# Patient Record
Sex: FEMALE | Race: BLACK OR AFRICAN AMERICAN | NOT HISPANIC OR LATINO | Employment: UNEMPLOYED | URBAN - METROPOLITAN AREA
[De-identification: names, ages, dates, MRNs, and addresses within clinical notes are randomized per-mention and may not be internally consistent; named-entity substitution may affect disease eponyms.]

---

## 2020-07-29 ENCOUNTER — OFFICE VISIT (OUTPATIENT)
Dept: ORTHOPEDIC SURGERY | Facility: CLINIC | Age: 40
End: 2020-07-29

## 2020-07-29 VITALS — HEIGHT: 72 IN | TEMPERATURE: 97.3 F | BODY MASS INDEX: 28.04 KG/M2 | WEIGHT: 207 LBS

## 2020-07-29 DIAGNOSIS — M25.512 LEFT SHOULDER PAIN, UNSPECIFIED CHRONICITY: Primary | ICD-10-CM

## 2020-07-29 PROCEDURE — 20605 DRAIN/INJ JOINT/BURSA W/O US: CPT | Performed by: ORTHOPAEDIC SURGERY

## 2020-07-29 PROCEDURE — 99203 OFFICE O/P NEW LOW 30 MIN: CPT | Performed by: ORTHOPAEDIC SURGERY

## 2020-07-29 PROCEDURE — 73030 X-RAY EXAM OF SHOULDER: CPT | Performed by: ORTHOPAEDIC SURGERY

## 2020-07-29 RX ORDER — METHYLPREDNISOLONE ACETATE 80 MG/ML
80 INJECTION, SUSPENSION INTRA-ARTICULAR; INTRALESIONAL; INTRAMUSCULAR; SOFT TISSUE
Status: COMPLETED | OUTPATIENT
Start: 2020-07-29 | End: 2020-07-29

## 2020-07-29 RX ADMIN — METHYLPREDNISOLONE ACETATE 80 MG: 80 INJECTION, SUSPENSION INTRA-ARTICULAR; INTRALESIONAL; INTRAMUSCULAR; SOFT TISSUE at 10:30
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
## 2020-08-14 ENCOUNTER — TELEPHONE (OUTPATIENT)
Dept: ORTHOPEDIC SURGERY | Facility: CLINIC | Age: 40
End: 2020-08-14

## 2020-08-14 DIAGNOSIS — M25.512 LEFT SHOULDER PAIN, UNSPECIFIED CHRONICITY: Primary | ICD-10-CM

## 2020-08-14 NOTE — TELEPHONE ENCOUNTER
Patient is still having some pain in her Lt shldr and would like to proceed with therapy. Please advise.cld

## 2020-08-18 ENCOUNTER — TREATMENT (OUTPATIENT)
Dept: PHYSICAL THERAPY | Facility: CLINIC | Age: 40
End: 2020-08-18

## 2020-08-18 DIAGNOSIS — M25.512 LEFT SHOULDER PAIN, UNSPECIFIED CHRONICITY: Primary | ICD-10-CM

## 2020-08-18 PROCEDURE — 97014 ELECTRIC STIMULATION THERAPY: CPT | Performed by: PHYSICAL THERAPIST

## 2020-08-18 PROCEDURE — 97161 PT EVAL LOW COMPLEX 20 MIN: CPT | Performed by: PHYSICAL THERAPIST
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
## 2020-08-21 ENCOUNTER — TREATMENT (OUTPATIENT)
Dept: PHYSICAL THERAPY | Facility: CLINIC | Age: 40
End: 2020-08-21

## 2020-08-21 DIAGNOSIS — M25.512 LEFT SHOULDER PAIN, UNSPECIFIED CHRONICITY: Primary | ICD-10-CM

## 2020-08-21 PROCEDURE — 97110 THERAPEUTIC EXERCISES: CPT | Performed by: PHYSICAL THERAPIST

## 2020-08-21 PROCEDURE — 97014 ELECTRIC STIMULATION THERAPY: CPT | Performed by: PHYSICAL THERAPIST

## 2020-08-21 PROCEDURE — 97035 APP MDLTY 1+ULTRASOUND EA 15: CPT | Performed by: PHYSICAL THERAPIST

## 2020-08-25 NOTE — PROGRESS NOTES
Physical Therapy Daily Progress Note    Visit #2    Subjective     Crystal Shoulders reports: no new complaints.       Objective   See Exercise, Manual, and Modality Logs for complete treatment.       Assessment/Plan  Tolerated AAROM activities well without pain.   Progress per Plan of Care           Manual Therapy:    0     mins  44643;  Therapeutic Exercise:    20     mins  10351;     Neuromuscular Abbi:    0    mins  53116;    Therapeutic Activity:     0     mins  94534;     Gait Trainin     mins  76561;     Ultrasound:     8     mins  60975;    Electrical Stimulation:    15     mins  18174 ( );    Timed Treatment:   28   mins   Total Treatment:     43   mins    Mariah Vicente PT, DPT  Physical Therapist  [place] License #[id number]

## 2020-08-26 ENCOUNTER — TREATMENT (OUTPATIENT)
Dept: PHYSICAL THERAPY | Facility: CLINIC | Age: 40
End: 2020-08-26

## 2020-08-26 DIAGNOSIS — M25.512 LEFT SHOULDER PAIN, UNSPECIFIED CHRONICITY: Primary | ICD-10-CM

## 2020-08-26 PROCEDURE — 97035 APP MDLTY 1+ULTRASOUND EA 15: CPT | Performed by: PHYSICAL THERAPIST

## 2020-08-26 PROCEDURE — 97110 THERAPEUTIC EXERCISES: CPT | Performed by: PHYSICAL THERAPIST

## 2020-08-26 PROCEDURE — 97014 ELECTRIC STIMULATION THERAPY: CPT | Performed by: PHYSICAL THERAPIST

## 2020-08-26 NOTE — PROGRESS NOTES
Physical Therapy Daily Progress Note    Visit #3    Subjective     Crystal Shoulders reports: no new complaints      Objective   See Exercise, Manual, and Modality Logs for complete treatment.       Assessment/Plan  Progressed scapular strengthening exercises, tolerated well.  Progress per Plan of Care           Manual Therapy:    0     mins  81531;  Therapeutic Exercise:    20     mins  49734;     Neuromuscular Abbi:    0    mins  99548;    Therapeutic Activity:     0     mins  80503;     Gait Trainin     mins  22352;     Ultrasound:     8     mins  20644;    Electrical Stimulation:    15     mins  08828 ( );    Timed Treatment:   28   mins   Total Treatment:     43   mins    Mariah Vicente PT, DPT  Physical Therapist  [place] License #[id number]

## 2020-09-14 ENCOUNTER — TREATMENT (OUTPATIENT)
Dept: PHYSICAL THERAPY | Facility: CLINIC | Age: 40
End: 2020-09-14

## 2020-09-14 DIAGNOSIS — M25.512 LEFT SHOULDER PAIN, UNSPECIFIED CHRONICITY: Primary | ICD-10-CM

## 2020-09-14 PROCEDURE — 97014 ELECTRIC STIMULATION THERAPY: CPT | Performed by: PHYSICAL THERAPIST

## 2020-09-14 PROCEDURE — 97035 APP MDLTY 1+ULTRASOUND EA 15: CPT | Performed by: PHYSICAL THERAPIST

## 2020-09-14 PROCEDURE — 97140 MANUAL THERAPY 1/> REGIONS: CPT | Performed by: PHYSICAL THERAPIST

## 2020-09-14 NOTE — PROGRESS NOTES
Physical Therapy Daily Progress Note    Visit #4    Subjective     Crystal Shoulders reports: she has been away from PT for the last 2 weeks because she had Covid 19. Her shoulder pain is a little worse, is having more pain on the top of her shoulder, with numbness/tingling into her arm.       Objective          Palpation   Left   No palpable tenderness to the cervical paraspinals, lower trapezius, middle trapezius, scalenes, sternocleidomastoid and suboccipitals.   Hypertonic in the levator scapulae and upper trapezius.   Tenderness of the levator scapulae and upper trapezius.     Tests   Cervical   Negative repeated extension.     Left   Negative active compression ([place]), cervical distraction and Spurling's sign.     Right   Negative active compression ([place]), cervical distraction and Spurling's sign.       See Exercise, Manual, and Modality Logs for complete treatment.       Assessment/Plan  Pt with muscle tightness in UT/levator tightness resulting in neural irritation. Improved symptoms after Rx, will resume exercises at next visit.  Progress per Plan of Care           Manual Therapy:    20     mins  86789;  Therapeutic Exercise:    6     mins  14142;     Neuromuscular Abbi:    0    mins  21438;    Therapeutic Activity:     0     mins  55140;     Gait Trainin     mins  53645;     Ultrasound:     8     mins  98771;    Electrical Stimulation:    15     mins  99067 ( );    Timed Treatment:   34   mins   Total Treatment:     50   mins    Mariah Vicente PT, DPT  Physical Therapist  [place] License #[id number]

## 2020-09-16 ENCOUNTER — TREATMENT (OUTPATIENT)
Dept: PHYSICAL THERAPY | Facility: CLINIC | Age: 40
End: 2020-09-16

## 2020-09-16 DIAGNOSIS — M25.512 LEFT SHOULDER PAIN, UNSPECIFIED CHRONICITY: Primary | ICD-10-CM

## 2020-09-16 PROCEDURE — 97014 ELECTRIC STIMULATION THERAPY: CPT | Performed by: PHYSICAL THERAPIST

## 2020-09-16 PROCEDURE — 97110 THERAPEUTIC EXERCISES: CPT | Performed by: PHYSICAL THERAPIST

## 2020-09-16 NOTE — PROGRESS NOTES
Physical Therapy Daily Progress Note    Visit #5    Subjective     Crystal Shoulders reports: pain is about the same. Numbness into her arm is improved.       Objective   See Exercise, Manual, and Modality Logs for complete treatment.       Assessment/Plan  Resumed exercises without pain. Encouraged pt to perform postural exercises frequently, as significant forward shoulder posture is likely contributing to symptoms.  Progress per Plan of Care, reassess next visit           Manual Therapy:    0     mins  76088;  Therapeutic Exercise:    25     mins  23238;     Neuromuscular Abbi:    0    mins  54270;    Therapeutic Activity:     0     mins  93785;     Gait Trainin     mins  65088;     Ultrasound:     8     mins  83137;    Electrical Stimulation:    15     mins  86100 ( );    Timed Treatment:   33   mins   Total Treatment:     48   mins    Mariah Vicente PT, DPT  Physical Therapist  [place] License #[id number]

## 2020-09-21 ENCOUNTER — TREATMENT (OUTPATIENT)
Dept: PHYSICAL THERAPY | Facility: CLINIC | Age: 40
End: 2020-09-21

## 2020-09-21 DIAGNOSIS — M25.512 LEFT SHOULDER PAIN, UNSPECIFIED CHRONICITY: Primary | ICD-10-CM

## 2020-09-21 PROCEDURE — 97110 THERAPEUTIC EXERCISES: CPT | Performed by: PHYSICAL THERAPIST

## 2020-09-21 PROCEDURE — 97035 APP MDLTY 1+ULTRASOUND EA 15: CPT | Performed by: PHYSICAL THERAPIST

## 2020-09-21 PROCEDURE — 97014 ELECTRIC STIMULATION THERAPY: CPT | Performed by: PHYSICAL THERAPIST

## 2020-09-21 NOTE — PROGRESS NOTES
Re-Assessment / Re-Certification    Patient: Renee Mattsons   : 1980  Diagnosis/ICD-10 Code:  Left shoulder pain, unspecified chronicity [M25.512]  Referring practitioner: Tal Reed MD  Date of Initial Visit: 2020  Today's Date: 2020  Patient seen for 6 sessions      Subjective:   Renee Shoulders reports: some improvement in pain, but still with difficulty reaching overhead or behind back because of pain. Also some numbness in shoulder.   Subjective Questionnaire: QuickDASH: 48% limitation (improved from 57% at initial evaluation)  Clinical Progress: improved  Home Program Compliance: Yes  Treatment has included: therapeutic exercise, electrical stimulation, ultrasound, moist heat and cryotherapy    Subjective   Objective          Active Range of Motion   Left Shoulder   Flexion: 154 degrees   Abduction: 136 degrees       Assessment/Plan  Progress toward previous goals: Partially Met    Short Term Goals: 4 weeks. Patient will:  1. Be independent with initial HEP (MET)  2. Be instructed in posture and body mechanics. (MET)    Long Term Goals: 8 weeks. Pt will:  1. Exhibit (L) shoulder AROM to WFL to allow for reaching overhead and out (ABD) without pain limiting function. (PROGRESSING)  2. Demonstrate improved Left UE MMT of >/= 4+/5 to allow for performance of ADL's/household management/recreational activities. (PROGRESSING)  3. Pt able to reach overhead and lift 10# to allow for return to doing home/yard/recreational activities with min to no pain. (NOT MET)  4. Report perceived disability </=10% based on QuickDASH (NOT MET)      Recommendations: Continue as planned  Timeframe: 1 month  Prognosis to achieve goals: good    PT Signature: Mariah Vicente PT, DPT                         Physical Therapist                         [place] License #[id number]    Based upon review of the patient's progress and continued therapy plan, it is my medical opinion that Crystal Shoulders should continue  physical therapy treatment at [facility]  [address] [facility], [address] [address] [phone].    Signature: __________________________________      Manual Therapy:    0     mins  59269;  Therapeutic Exercise:    30     mins  59875;     Neuromuscular Abbi:    0    mins  17927;    Therapeutic Activity:     0     mins  13598;     Gait Trainin     mins  39276;     Ultrasound:     8     mins  16424;    Electrical Stimulation:    15     mins  63225 ( );    Timed Treatment:   38   mins   Total Treatment:     53   mins

## 2020-09-23 ENCOUNTER — TREATMENT (OUTPATIENT)
Dept: PHYSICAL THERAPY | Facility: CLINIC | Age: 40
End: 2020-09-23

## 2020-09-23 DIAGNOSIS — M25.512 LEFT SHOULDER PAIN, UNSPECIFIED CHRONICITY: Primary | ICD-10-CM

## 2020-09-23 PROCEDURE — 97014 ELECTRIC STIMULATION THERAPY: CPT | Performed by: PHYSICAL THERAPIST

## 2020-09-23 PROCEDURE — 97110 THERAPEUTIC EXERCISES: CPT | Performed by: PHYSICAL THERAPIST

## 2020-09-23 PROCEDURE — 97140 MANUAL THERAPY 1/> REGIONS: CPT | Performed by: PHYSICAL THERAPIST

## 2020-09-23 PROCEDURE — 97035 APP MDLTY 1+ULTRASOUND EA 15: CPT | Performed by: PHYSICAL THERAPIST

## 2020-09-23 NOTE — PROGRESS NOTES
" Physical Therapy Daily Progress Note    Visit #7    Subjective     Crystal Shoulders reports: she feels like she \"slept wrong\", shoulder is sore      Objective   See Exercise, Manual, and Modality Logs for complete treatment.       Assessment/Plan  Tenderness/increased tone in upper trap. Improved with manual therapy.  Progress per Plan of Care           Manual Therapy:    10     mins  57245;  Therapeutic Exercise:    20     mins  29146;     Neuromuscular Abbi:    0    mins  34582;    Therapeutic Activity:     0     mins  44237;     Gait Trainin     mins  44022;     Ultrasound:     8     mins  87517;    Electrical Stimulation:    15     mins  46650 ( );    Timed Treatment:   38   mins   Total Treatment:     53   mins    Mariah Vicente PT, DPT  Physical Therapist  [place] License #[id number]                    "

## 2020-09-28 ENCOUNTER — TREATMENT (OUTPATIENT)
Dept: PHYSICAL THERAPY | Facility: CLINIC | Age: 40
End: 2020-09-28

## 2020-09-28 DIAGNOSIS — M25.512 LEFT SHOULDER PAIN, UNSPECIFIED CHRONICITY: Primary | ICD-10-CM

## 2020-09-28 PROCEDURE — 97014 ELECTRIC STIMULATION THERAPY: CPT | Performed by: PHYSICAL THERAPIST

## 2020-09-28 PROCEDURE — 97110 THERAPEUTIC EXERCISES: CPT | Performed by: PHYSICAL THERAPIST

## 2020-09-28 PROCEDURE — 97035 APP MDLTY 1+ULTRASOUND EA 15: CPT | Performed by: PHYSICAL THERAPIST

## 2020-09-28 NOTE — PROGRESS NOTES
" Physical Therapy Daily Progress Note    Visit #8    Subjective     Crystal Shoulders reports: she feels that she is \"sleeping wrong\" and making shoulder hurt. Got a massage on Friday, feels that this helped some.       Objective   See Exercise, Manual, and Modality Logs for complete treatment.       Assessment/Plan  Pt is able to correct posture with min VC's. No pain with exercises when she maintains good posture.  Progress per Plan of Care           Manual Therapy:    0     mins  17991;  Therapeutic Exercise:    30     mins  59225;     Neuromuscular Abbi:    0    mins  75762;    Therapeutic Activity:     0     mins  20256;     Gait Trainin     mins  41722;     Ultrasound:     8     mins  51510;    Electrical Stimulation:    15     mins  82770 ( );    Timed Treatment:   38   mins   Total Treatment:     53   mins    Mariah Vicente PT, DPT  Physical Therapist  [place] License #[id number]                    "

## 2020-10-06 ENCOUNTER — TREATMENT (OUTPATIENT)
Dept: PHYSICAL THERAPY | Facility: CLINIC | Age: 40
End: 2020-10-06

## 2020-10-06 DIAGNOSIS — M25.512 LEFT SHOULDER PAIN, UNSPECIFIED CHRONICITY: Primary | ICD-10-CM

## 2020-10-06 PROCEDURE — 97110 THERAPEUTIC EXERCISES: CPT | Performed by: PHYSICAL THERAPIST

## 2020-10-06 PROCEDURE — 97014 ELECTRIC STIMULATION THERAPY: CPT | Performed by: PHYSICAL THERAPIST

## 2020-10-06 PROCEDURE — 97035 APP MDLTY 1+ULTRASOUND EA 15: CPT | Performed by: PHYSICAL THERAPIST

## 2020-10-06 NOTE — PROGRESS NOTES
Physical Therapy Daily Progress Note    Visit #9    Subjective     Crystal Shoulders reports: she has been through a lot over the past week, hasn't had much sleep. Shoulder is sore.      Objective   See Exercise, Manual, and Modality Logs for complete treatment.       Assessment/Plan  Focused on ROM and modalities today, will progress exercises next visit.  Progress per Plan of Care           Manual Therapy:    0     mins  67207;  Therapeutic Exercise:    20     mins  27824;     Neuromuscular Abbi:    0    mins  65253;    Therapeutic Activity:     0     mins  54569;     Gait Trainin     mins  90634;     Ultrasound:     8     mins  51969;    Electrical Stimulation:    15     mins  07702 ( );    Timed Treatment:   28   mins   Total Treatment:     43   mins    Mariah Vicente PT, DPT  Physical Therapist  [place] License #[id number]

## 2020-10-08 ENCOUNTER — TREATMENT (OUTPATIENT)
Dept: PHYSICAL THERAPY | Facility: CLINIC | Age: 40
End: 2020-10-08

## 2020-10-08 DIAGNOSIS — M25.512 LEFT SHOULDER PAIN, UNSPECIFIED CHRONICITY: Primary | ICD-10-CM

## 2020-10-08 PROCEDURE — 97014 ELECTRIC STIMULATION THERAPY: CPT | Performed by: PHYSICAL THERAPIST

## 2020-10-08 PROCEDURE — 97110 THERAPEUTIC EXERCISES: CPT | Performed by: PHYSICAL THERAPIST

## 2020-10-08 PROCEDURE — 97035 APP MDLTY 1+ULTRASOUND EA 15: CPT | Performed by: PHYSICAL THERAPIST

## 2020-10-08 NOTE — PROGRESS NOTES
" Physical Therapy Daily Progress Note    Visit #10    Subjective     Crystal Shoulders reports: shoulder is \"ok\", notices pain when driving      Objective   See Exercise, Manual, and Modality Logs for complete treatment.       Assessment/Plan  ROM is WNL, strength is improving. Pain is largely unchanged.   Progress per Plan of Care           Manual Therapy:    0     mins  49414;  Therapeutic Exercise:    30     mins  02437;     Neuromuscular Abbi:    0    mins  47588;    Therapeutic Activity:     0     mins  55249;     Gait Trainin     mins  48023;     Ultrasound:     8     mins  83965;    Electrical Stimulation:    15     mins  75308 ( );    Timed Treatment:   38   mins   Total Treatment:     53   mins    Mariah Vicente PT, DPT  Physical Therapist  [place] License #[id number]                    "

## 2020-10-12 ENCOUNTER — TREATMENT (OUTPATIENT)
Dept: PHYSICAL THERAPY | Facility: CLINIC | Age: 40
End: 2020-10-12

## 2020-10-12 DIAGNOSIS — M25.512 LEFT SHOULDER PAIN, UNSPECIFIED CHRONICITY: Primary | ICD-10-CM

## 2020-10-12 PROCEDURE — 97014 ELECTRIC STIMULATION THERAPY: CPT | Performed by: PHYSICAL THERAPIST

## 2020-10-12 PROCEDURE — 97035 APP MDLTY 1+ULTRASOUND EA 15: CPT | Performed by: PHYSICAL THERAPIST

## 2020-10-12 PROCEDURE — 97110 THERAPEUTIC EXERCISES: CPT | Performed by: PHYSICAL THERAPIST

## 2020-10-13 NOTE — PROGRESS NOTES
Physical Therapy Daily Progress Note    Visit #11    Subjective     Crystal Shoulders reports: numbness in bilateral arms over the weekend. Resolved within a day. Shoulder overall is slightly better than before starting PT, but no improvement over the past couple of weeks. Exercises make symptoms better short term.       Objective   See Exercise, Manual, and Modality Logs for complete treatment.       Assessment/Plan  Tolerated treatment well without increased pain or UE numbness. Advised pt to contact MD office regarding bilateral arm numbness, she verbalizes understanding.   Progress per Plan of Care           Manual Therapy:    0     mins  19302;  Therapeutic Exercise:    32     mins  27760;     Neuromuscular Abbi:    0    mins  20515;    Therapeutic Activity:     0     mins  28762;     Gait Trainin     mins  41254;     Ultrasound:     8     mins  42097;    Electrical Stimulation:    15     mins  48459 ( );    Timed Treatment:   40   mins   Total Treatment:     55   mins    Mariah Vicente PT, DPT  Physical Therapist  [place] License #[id number]

## 2020-10-14 ENCOUNTER — TREATMENT (OUTPATIENT)
Dept: PHYSICAL THERAPY | Facility: CLINIC | Age: 40
End: 2020-10-14

## 2020-10-14 DIAGNOSIS — M25.512 LEFT SHOULDER PAIN, UNSPECIFIED CHRONICITY: Primary | ICD-10-CM

## 2020-10-14 PROCEDURE — 97035 APP MDLTY 1+ULTRASOUND EA 15: CPT | Performed by: PHYSICAL THERAPIST

## 2020-10-14 PROCEDURE — 97014 ELECTRIC STIMULATION THERAPY: CPT | Performed by: PHYSICAL THERAPIST

## 2020-10-14 PROCEDURE — 97110 THERAPEUTIC EXERCISES: CPT | Performed by: PHYSICAL THERAPIST

## 2020-10-14 NOTE — PROGRESS NOTES
Physical Therapy Daily Progress Note    Visit #12    Subjective     Crystal Shoulders reports: she is having a little bit of numbness in her hands today. Spoke to MD, who ordered an MRI on her neck.      Objective   See Exercise, Manual, and Modality Logs for complete treatment.       Assessment/Plan  Modified some exercises to reduce strain on C-spine. Tolerated well overall.   Progress per Plan of Care           Manual Therapy:    0     mins  29451;  Therapeutic Exercise:    30     mins  48452;     Neuromuscular Abbi:    0    mins  04586;    Therapeutic Activity:     0     mins  53190;     Gait Trainin     mins  60745;     Ultrasound:     8     mins  03307;    Electrical Stimulation:    15     mins  94109 ( );    Timed Treatment:   38   mins   Total Treatment:     53   mins    Mariah Vicente PT, DPT  Physical Therapist  [place] License #[id number]

## 2020-10-27 ENCOUNTER — TREATMENT (OUTPATIENT)
Dept: PHYSICAL THERAPY | Facility: CLINIC | Age: 40
End: 2020-10-27

## 2020-10-27 DIAGNOSIS — M25.512 LEFT SHOULDER PAIN, UNSPECIFIED CHRONICITY: Primary | ICD-10-CM

## 2020-10-27 PROCEDURE — 97014 ELECTRIC STIMULATION THERAPY: CPT | Performed by: PHYSICAL THERAPIST

## 2020-10-27 PROCEDURE — 97035 APP MDLTY 1+ULTRASOUND EA 15: CPT | Performed by: PHYSICAL THERAPIST

## 2020-10-27 PROCEDURE — 97110 THERAPEUTIC EXERCISES: CPT | Performed by: PHYSICAL THERAPIST

## 2020-10-27 NOTE — PROGRESS NOTES
Re-Assessment / Re-Certification    Patient: Renee Mattsons   : 1980  Diagnosis/ICD-10 Code:  Left shoulder pain, unspecified chronicity [M25.512]  Referring practitioner: Tal Reed MD  Date of Initial Visit: 2020  Today's Date: 10/27/2020  Patient seen for 13 sessions      Subjective:   Renee Shoulders reports: continued pain in shoulder, little change overall.  Subjective Questionnaire: QuickDASH: 66% limitation (worsened from 48% at last reassessment)  Clinical Progress: worsened  Home Program Compliance: Yes  Treatment has included: therapeutic exercise, manual therapy, electrical stimulation, ultrasound and cryotherapy    Subjective   Objective          Active Range of Motion   Left Shoulder   Flexion: 142 degrees with pain  Abduction: 105 degrees with pain  External rotation BTH: C2 with pain  Internal rotation BTB: L5 with pain    Strength/Myotome Testing     Left Shoulder     Planes of Motion   Flexion: 3-   Abduction: 3-   External rotation at 45°: 3-   Internal rotation at 45°: 3-       Assessment/Plan  Progress toward previous goals: Partially Met    Short Term Goals: 4 weeks. Patient will:  1. Be independent with initial HEP (MET)  2. Be instructed in posture and body mechanics. (MET)    Long Term Goals: 8 weeks. Pt will:  1. Exhibit (L) shoulder AROM to WFL to allow for reaching overhead and out (ABD) without pain limiting function. (PROGRESSING)  2. Demonstrate improved Left UE MMT of >/= 4+/5 to allow for performance of ADL's/household management/recreational activities. (PROGRESSING)  3. Pt able to reach overhead and lift 10# to allow for return to doing home/yard/recreational activities with min to no pain. (NOT MET)  4. Report perceived disability </=10% based on QuickDASH (NOT MET)      Recommendations: Continue with recommendations pt to contact MD for follow up due to worsening symptoms  Timeframe: 1 month  Prognosis to achieve goals: good    PT Signature: Mariah Vicente  PT, DPT                         Physical Therapist                         [place] License #[id number]    Based upon review of the patient's progress and continued therapy plan, it is my medical opinion that Crystal Shoulders should continue physical therapy treatment at [facility]  [address] [place] [id number] [phone].    Signature: __________________________________      Manual Therapy:    0     mins  59857;  Therapeutic Exercise:    35     mins  58350;     Neuromuscular Abbi:    0    mins  39734;    Therapeutic Activity:     0     mins  36172;     Gait Trainin     mins  39367;     Ultrasound:     8     mins  59636;    Electrical Stimulation:    15     mins  11242 ( );    Timed Treatment:   43   mins   Total Treatment:     58   mins

## 2020-10-29 ENCOUNTER — TREATMENT (OUTPATIENT)
Dept: PHYSICAL THERAPY | Facility: CLINIC | Age: 40
End: 2020-10-29

## 2020-10-29 DIAGNOSIS — M25.512 LEFT SHOULDER PAIN, UNSPECIFIED CHRONICITY: Primary | ICD-10-CM

## 2020-10-29 PROCEDURE — 97014 ELECTRIC STIMULATION THERAPY: CPT | Performed by: PHYSICAL THERAPIST

## 2020-10-29 PROCEDURE — 97110 THERAPEUTIC EXERCISES: CPT | Performed by: PHYSICAL THERAPIST

## 2020-10-29 PROCEDURE — 97035 APP MDLTY 1+ULTRASOUND EA 15: CPT | Performed by: PHYSICAL THERAPIST

## 2020-10-29 NOTE — PROGRESS NOTES
Physical Therapy Daily Progress Note    Visit #14    Subjective     Crystal Shoulders reports: shoulder is still hurting. Has an appointment with Dr. Reed on . Back and neck are also hurting more, had MRI yesterday and is seeing MD today.      Objective   See Exercise, Manual, and Modality Logs for complete treatment.       Assessment/Plan  Shoulder PROM is WNL, AROM is limited by pain. Will hold PT at this time pending visit with Dr. Reed.   Progress per Plan of Care           Manual Therapy:    0     mins  61364;  Therapeutic Exercise:    30     mins  83559;     Neuromuscular Abbi:    0    mins  96069;    Therapeutic Activity:     0     mins  27415;     Gait Trainin     mins  62447;     Ultrasound:     8     mins  30885;    Electrical Stimulation:    15     mins  17993 ( );    Timed Treatment:   38   mins   Total Treatment:     53   mins    Mariah Vicente PT, DPT  Physical Therapist  [place] License #[id number]

## 2020-11-09 ENCOUNTER — OFFICE VISIT (OUTPATIENT)
Dept: ORTHOPEDIC SURGERY | Facility: CLINIC | Age: 40
End: 2020-11-09

## 2020-11-09 VITALS — BODY MASS INDEX: 27.36 KG/M2 | WEIGHT: 202 LBS | TEMPERATURE: 97.3 F | HEIGHT: 72 IN

## 2020-11-09 DIAGNOSIS — G89.29 CHRONIC LEFT SHOULDER PAIN: Primary | ICD-10-CM

## 2020-11-09 DIAGNOSIS — M25.512 CHRONIC LEFT SHOULDER PAIN: Primary | ICD-10-CM

## 2020-11-09 PROCEDURE — 99212 OFFICE O/P EST SF 10 MIN: CPT | Performed by: ORTHOPAEDIC SURGERY

## 2020-11-10 ENCOUNTER — TREATMENT (OUTPATIENT)
Dept: PHYSICAL THERAPY | Facility: CLINIC | Age: 40
End: 2020-11-10

## 2020-11-10 DIAGNOSIS — M25.512 LEFT SHOULDER PAIN, UNSPECIFIED CHRONICITY: Primary | ICD-10-CM

## 2020-11-10 PROCEDURE — 97035 APP MDLTY 1+ULTRASOUND EA 15: CPT | Performed by: PHYSICAL THERAPIST

## 2020-11-10 PROCEDURE — 97110 THERAPEUTIC EXERCISES: CPT | Performed by: PHYSICAL THERAPIST

## 2020-11-10 PROCEDURE — 97014 ELECTRIC STIMULATION THERAPY: CPT | Performed by: PHYSICAL THERAPIST

## 2020-11-10 NOTE — PROGRESS NOTES
Physical Therapy Daily Progress Note    Visit #15    Subjective     Crystal Shoulders reports: she saw MD, who recommended continuing PT until max improvement reached. Stated that it could take several more months for symptoms to resolve.       Objective   See Exercise, Manual, and Modality Logs for complete treatment.       Assessment/Plan  Tolerated well. Likely reached max benefit with ultrasound, will D/C this and increase scapular stabilization exercises.   Progress per Plan of Care           Manual Therapy:    0     mins  83642;  Therapeutic Exercise:    30     mins  69641;     Neuromuscular Abbi:    0    mins  56188;    Therapeutic Activity:     0     mins  21051;     Gait Trainin     mins  48085;     Ultrasound:     8     mins  16150;    Electrical Stimulation:    15     mins  81693 ( );    Timed Treatment:   38   mins   Total Treatment:     53   mins    Mariah Vicente PT, DPT  Physical Therapist  [place] License #[id number]

## 2020-11-10 NOTE — PROGRESS NOTES
Chief Complaint: Follow-up regarding left shoulder pain    HPI: Ms. Francois follows up for her left shoulder.  The last injection did help.  She has been diligent about going to PT and that seems to have helped as well.  Overall, she says things are better but the shoulder continues to bother her.  Symptoms are worse when lying on her left side as well as with certain reaching and lifting movements.    Exam: Left shoulder is briefly examined.  Skin is benign.  Mild tenderness over the acromioclavicular joint.  Full motion.  Positive active compression maneuver.    Imaging: None taken    Assessment: Low-grade left acromioclavicular separation    Plan: We discussed further options for her.  She did not wish to get the injection repeated today.  She would like to give the therapy a bit more time.  I consider that is a very reasonable approach.  For now, she will follow-up as needed.  If the symptoms persist or recur, I told her I will be happy to reevaluate at any point down the road.    Tal Reed MD  11/09/2020

## 2020-11-12 ENCOUNTER — TREATMENT (OUTPATIENT)
Dept: PHYSICAL THERAPY | Facility: CLINIC | Age: 40
End: 2020-11-12

## 2020-11-12 DIAGNOSIS — M25.512 LEFT SHOULDER PAIN, UNSPECIFIED CHRONICITY: Primary | ICD-10-CM

## 2020-11-12 PROCEDURE — 97014 ELECTRIC STIMULATION THERAPY: CPT | Performed by: PHYSICAL THERAPIST

## 2020-11-12 PROCEDURE — 97110 THERAPEUTIC EXERCISES: CPT | Performed by: PHYSICAL THERAPIST

## 2020-11-12 NOTE — PROGRESS NOTES
Physical Therapy Daily Progress Note    Visit #16    Subjective     Crystal Shoulders reports: no new complaints.      Objective   See Exercise, Manual, and Modality Logs for complete treatment.       Assessment/Plan  D/C'd ultrasound, progress shoulder stabilization exercises today. Tolerated well without adverse reaction.  Progress per Plan of Care           Manual Therapy:    0     mins  08342;  Therapeutic Exercise:    38     mins  60414;     Neuromuscular Abbi:    0    mins  07858;    Therapeutic Activity:     0     mins  18265;     Gait Trainin     mins  01053;     Ultrasound:     0     mins  30724;    Electrical Stimulation:    15     mins  10664 ( );    Timed Treatment:   38   mins   Total Treatment:     53   mins    Mariah Vicente PT, DPT  Physical Therapist  [place] License #[id number]

## 2020-11-17 ENCOUNTER — TREATMENT (OUTPATIENT)
Dept: PHYSICAL THERAPY | Facility: CLINIC | Age: 40
End: 2020-11-17

## 2020-11-17 DIAGNOSIS — M25.512 LEFT SHOULDER PAIN, UNSPECIFIED CHRONICITY: Primary | ICD-10-CM

## 2020-11-17 PROCEDURE — 97014 ELECTRIC STIMULATION THERAPY: CPT | Performed by: PHYSICAL THERAPIST

## 2020-11-17 PROCEDURE — 97530 THERAPEUTIC ACTIVITIES: CPT | Performed by: PHYSICAL THERAPIST

## 2020-11-17 PROCEDURE — 97110 THERAPEUTIC EXERCISES: CPT | Performed by: PHYSICAL THERAPIST

## 2020-11-17 NOTE — PROGRESS NOTES
Physical Therapy Daily Progress Note    Visit #17    Subjective     Crystal Shoulders reports: no recent changes in symptoms. Exercises felt OK last visit.       Objective   See Exercise, Manual, and Modality Logs for complete treatment.       Assessment/Plan  Tolerated new scapular stabilization activities well without pain.   Progress per Plan of Care           Manual Therapy:    0     mins  71008;  Therapeutic Exercise:    20     mins  59136;     Neuromuscular Abbi:    0    mins  20489;    Therapeutic Activity:     15     mins  27883;     Gait Trainin     mins  38188;     Ultrasound:     0     mins  49663;    Electrical Stimulation:    15     mins  94560 ( );    Timed Treatment:   35   mins   Total Treatment:     50   mins    Mariah Vicente PT, DPT  Physical Therapist  [place] License #[id number]

## 2020-11-23 ENCOUNTER — TREATMENT (OUTPATIENT)
Dept: PHYSICAL THERAPY | Facility: CLINIC | Age: 40
End: 2020-11-23

## 2020-11-23 DIAGNOSIS — M25.512 LEFT SHOULDER PAIN, UNSPECIFIED CHRONICITY: Primary | ICD-10-CM

## 2020-11-23 PROCEDURE — 97110 THERAPEUTIC EXERCISES: CPT | Performed by: PHYSICAL THERAPIST

## 2020-11-23 PROCEDURE — 97014 ELECTRIC STIMULATION THERAPY: CPT | Performed by: PHYSICAL THERAPIST

## 2020-11-23 NOTE — PROGRESS NOTES
Physical Therapy Daily Progress Note    Visit #18    Subjective     Crystal Shoulders reports: no new complaints with her shoulder. Is getting injections in her low back this afternoon.      Objective   See Exercise, Manual, and Modality Logs for complete treatment.       Assessment/Plan  Progressed scapular stabilization exercises today, tolerated well without increased pain.  Progress per Plan of Care           Manual Therapy:    0     mins  26609;  Therapeutic Exercise:    35     mins  86705;     Neuromuscular Abbi:    0    mins  43705;    Therapeutic Activity:     0     mins  35480;     Gait Trainin     mins  25769;     Ultrasound:     0     mins  72263;    Electrical Stimulation:    15     mins  12698 ( );    Timed Treatment:   35   mins   Total Treatment:     50   mins    Mariah Vicente PT, DPT  Physical Therapist  [place] License #[id number]

## 2020-11-25 ENCOUNTER — TREATMENT (OUTPATIENT)
Dept: PHYSICAL THERAPY | Facility: CLINIC | Age: 40
End: 2020-11-25

## 2020-11-25 DIAGNOSIS — M25.512 LEFT SHOULDER PAIN, UNSPECIFIED CHRONICITY: Primary | ICD-10-CM

## 2020-11-25 PROCEDURE — 97014 ELECTRIC STIMULATION THERAPY: CPT | Performed by: PHYSICAL THERAPIST

## 2020-11-25 PROCEDURE — 97110 THERAPEUTIC EXERCISES: CPT | Performed by: PHYSICAL THERAPIST

## 2020-11-25 NOTE — PROGRESS NOTES
Physical Therapy Daily Progress Note    Visit #19    Subjective     Crystal Shoulders reports: her shoulder is still hurting at night, but is hurting less during the day.      Objective   See Exercise, Manual, and Modality Logs for complete treatment.       Assessment/Plan  Pain free AROM is improved.  Progress per Plan of Care, reassess next visit           Manual Therapy:    0     mins  23942;  Therapeutic Exercise:    35     mins  19914;     Neuromuscular Abbi:    0    mins  83941;    Therapeutic Activity:     0     mins  60724;     Gait Trainin     mins  18423;     Ultrasound:     0     mins  06433;    Electrical Stimulation:    15     mins  33291 ( );    Timed Treatment:   35   mins   Total Treatment:     50   mins    Mariah Vicente PT, DPT  Physical Therapist  [place] License #[id number]

## 2020-12-01 ENCOUNTER — TREATMENT (OUTPATIENT)
Dept: PHYSICAL THERAPY | Facility: CLINIC | Age: 40
End: 2020-12-01

## 2020-12-01 DIAGNOSIS — M25.512 LEFT SHOULDER PAIN, UNSPECIFIED CHRONICITY: Primary | ICD-10-CM

## 2020-12-01 PROCEDURE — 97014 ELECTRIC STIMULATION THERAPY: CPT | Performed by: PHYSICAL THERAPIST

## 2020-12-01 PROCEDURE — 97110 THERAPEUTIC EXERCISES: CPT | Performed by: PHYSICAL THERAPIST

## 2020-12-01 NOTE — PROGRESS NOTES
Re-Assessment / Re-Certification     Patient: Renee Mattsons   : 1980  Diagnosis/ICD-10 Code:  Left shoulder pain, unspecified chronicity [M25.512]  Referring practitioner: Tal Reed MD  Date of Initial Visit: 2020  Today's Date: 2020  Patient seen for 20 sessions        Subjective:   Renee Shoulders reports: continued pain in shoulder, little change overall.  Subjective Questionnaire: QuickDASH: 45% limitation (improved from 66% at last reassessment)  Clinical Progress: improved  Home Program Compliance: Yes  Treatment has included: therapeutic exercise, manual therapy, electrical stimulation, ultrasound and cryotherapy     Subjective   Objective            Active Range of Motion   Left Shoulder   Flexion: 151 degrees with pain  Abduction: 124 degrees with pain  External rotation BTH: C2 with pain  Internal rotation BTB: L5 with pain     Strength/Myotome Testing     Left Shoulder      Planes of Motion   Flexion: 4/5 in available range   Abduction: 4/5 in available range  External rotation at 45°: 4/5 in available range  Internal rotation at 45°: 4/5 in available range     Assessment/Plan  Progress toward previous goals: Partially Met     Short Term Goals: 4 weeks. Patient will:  1. Be independent with initial HEP (MET)  2. Be instructed in posture and body mechanics. (MET)    Long Term Goals: 8 weeks. Pt will:  1. Exhibit (L) shoulder AROM to WFL to allow for reaching overhead and out (ABD) without pain limiting function. (PROGRESSING)  2. Demonstrate improved Left UE MMT of >/= 4+/5 to allow for performance of ADL's/household management/recreational activities. (PROGRESSING)  3. Pt able to reach overhead and lift 10# to allow for return to doing home/yard/recreational activities with min to no pain. (NOT MET)  4. Report perceived disability </=10% based on QuickDASH (PROGRESSING)                Recommendations: Continue with focus on scapular stabilization exercises and functional  GH mobility.  Timeframe: 1 month  Prognosis to achieve goals: good     PT Signature: Mariah Vicente, PT, DPT                         Physical Therapist                         [place] License #[id number]     Based upon review of the patient's progress and continued therapy plan, it is my medical opinion that Crystal Shoulders should continue physical therapy treatment at [facility]  [address] [place] [id number] [phone].     Signature: __________________________________       Manual Therapy:            0     mins  87441;  Therapeutic Exercise:    35     mins  63385;     Neuromuscular Abbi:    0    mins  40123;    Therapeutic Activity:      0     mins  26271;     Gait Trainin     mins  60026;     Ultrasound:                     0     mins  42626;    Electrical Stimulation:    15     mins  82184 ( );     Timed Treatment:   35   mins   Total Treatment:     50   mins

## 2020-12-03 ENCOUNTER — TREATMENT (OUTPATIENT)
Dept: PHYSICAL THERAPY | Facility: CLINIC | Age: 40
End: 2020-12-03

## 2020-12-03 DIAGNOSIS — M25.512 LEFT SHOULDER PAIN, UNSPECIFIED CHRONICITY: Primary | ICD-10-CM

## 2020-12-03 PROCEDURE — 97110 THERAPEUTIC EXERCISES: CPT | Performed by: PHYSICAL THERAPIST

## 2020-12-03 PROCEDURE — 97014 ELECTRIC STIMULATION THERAPY: CPT | Performed by: PHYSICAL THERAPIST

## 2020-12-03 NOTE — PROGRESS NOTES
Physical Therapy Daily Progress Note    Visit #21    Subjective     Crystal Shoulders reports: overall shoulder is much improved, but notices now that she is able to get her arm overhead she is sleeping with arm in odd positions and waking with achy pain.       Objective   See Exercise, Manual, and Modality Logs for complete treatment.       Assessment/Plan  Advised pt to use a pillow to support arm when she sleeps. Progressed band resistance today, tolerated well without increased pain.  Progress per Plan of Care           Manual Therapy:    0     mins  37706;  Therapeutic Exercise:    35     mins  87888;     Neuromuscular Abbi:    0    mins  42680;    Therapeutic Activity:     0     mins  55737;     Gait Trainin     mins  90443;     Ultrasound:     0     mins  74702;    Electrical Stimulation:    15     mins  76513 ( );    Timed Treatment:   35   mins   Total Treatment:     50   mins    Mariah Vicente PT, DPT  Physical Therapist  [place] License #[id number]

## 2020-12-08 ENCOUNTER — TREATMENT (OUTPATIENT)
Dept: PHYSICAL THERAPY | Facility: CLINIC | Age: 40
End: 2020-12-08

## 2020-12-08 DIAGNOSIS — M25.512 LEFT SHOULDER PAIN, UNSPECIFIED CHRONICITY: Primary | ICD-10-CM

## 2020-12-08 PROCEDURE — 97014 ELECTRIC STIMULATION THERAPY: CPT | Performed by: PHYSICAL THERAPIST

## 2020-12-08 PROCEDURE — 97110 THERAPEUTIC EXERCISES: CPT | Performed by: PHYSICAL THERAPIST

## 2020-12-08 NOTE — PROGRESS NOTES
Physical Therapy Daily Progress Note    Visit #22    Subjective     Crystal Shoulders reports: she feels that she has more shoulder mobility, does notice soreness in AM because she is now sleeping with shoulder flexed.       Objective   See Exercise, Manual, and Modality Logs for complete treatment.       Assessment/Plan  Tolerated well. Advised pt to support arm on pillow to avoid prolonged time in end range flexion when sleeping.  Progress per Plan of Care           Manual Therapy:    0     mins  79466;  Therapeutic Exercise:    35     mins  14878;     Neuromuscular Abbi:    0    mins  88287;    Therapeutic Activity:     0     mins  75309;     Gait Trainin     mins  54600;     Ultrasound:     0     mins  49663;    Electrical Stimulation:    15     mins  07878 ( );    Timed Treatment:   35   mins   Total Treatment:     50   mins    Mariah Vicente PT, DPT  Physical Therapist  [place] License #[id number]

## 2020-12-14 ENCOUNTER — TREATMENT (OUTPATIENT)
Dept: PHYSICAL THERAPY | Facility: CLINIC | Age: 40
End: 2020-12-14

## 2020-12-14 DIAGNOSIS — M25.512 LEFT SHOULDER PAIN, UNSPECIFIED CHRONICITY: Primary | ICD-10-CM

## 2020-12-14 PROCEDURE — 97110 THERAPEUTIC EXERCISES: CPT | Performed by: PHYSICAL THERAPIST

## 2020-12-14 PROCEDURE — 97035 APP MDLTY 1+ULTRASOUND EA 15: CPT | Performed by: PHYSICAL THERAPIST

## 2020-12-14 PROCEDURE — 97014 ELECTRIC STIMULATION THERAPY: CPT | Performed by: PHYSICAL THERAPIST

## 2020-12-14 NOTE — PROGRESS NOTES
Physical Therapy Daily Progress Note    Visit #23    Subjective     Crystal Shoulders reports: her shoulder has been more painful the last couple of days.      Objective   See Exercise, Manual, and Modality Logs for complete treatment.       Assessment/Plan  Resumed ultrasound as pt is reporting more pain today. Will progress exercises as able.  Progress per Plan of Care           Manual Therapy:    0     mins  00110;  Therapeutic Exercise:    32     mins  55288;     Neuromuscular Abbi:    0    mins  21344;    Therapeutic Activity:     0     mins  46392;     Gait Trainin     mins  80154;     Ultrasound:     8     mins  24328;    Electrical Stimulation:    15     mins  98551 ( );    Timed Treatment:   40   mins   Total Treatment:     55   mins    Mariah Vicente PT, DPT  Physical Therapist  [place] License #[id number]

## 2020-12-18 ENCOUNTER — TREATMENT (OUTPATIENT)
Dept: PHYSICAL THERAPY | Facility: CLINIC | Age: 40
End: 2020-12-18

## 2020-12-18 DIAGNOSIS — M25.512 LEFT SHOULDER PAIN, UNSPECIFIED CHRONICITY: Primary | ICD-10-CM

## 2020-12-18 PROCEDURE — 97014 ELECTRIC STIMULATION THERAPY: CPT | Performed by: PHYSICAL THERAPIST

## 2020-12-18 PROCEDURE — 97110 THERAPEUTIC EXERCISES: CPT | Performed by: PHYSICAL THERAPIST

## 2020-12-18 NOTE — PROGRESS NOTES
" Physical Therapy Daily Progress Note    Visit #24    Subjective     Crystal Shoulders reports: shoulder is \"OK\"      Objective   See Exercise, Manual, and Modality Logs for complete treatment.       Assessment/Plan  Able to increase resistance with exercises without pain.   Progress per Plan of Care           Manual Therapy:    0     mins  15845;  Therapeutic Exercise:    34     mins  80709;     Neuromuscular Abbi:    0    mins  85757;    Therapeutic Activity:     0     mins  39553;     Gait Trainin     mins  02332;     Ultrasound:     0     mins  98179;    Electrical Stimulation:    15     mins  60792 ( );    Timed Treatment:   34   mins   Total Treatment:     49   mins    Mariah Vicente PT, DPT  Physical Therapist  [place] License #[id number]                    "

## 2020-12-21 ENCOUNTER — TREATMENT (OUTPATIENT)
Dept: PHYSICAL THERAPY | Facility: CLINIC | Age: 40
End: 2020-12-21

## 2020-12-21 DIAGNOSIS — M25.512 LEFT SHOULDER PAIN, UNSPECIFIED CHRONICITY: Primary | ICD-10-CM

## 2020-12-21 PROCEDURE — 97110 THERAPEUTIC EXERCISES: CPT | Performed by: PHYSICAL THERAPIST

## 2020-12-21 PROCEDURE — 97014 ELECTRIC STIMULATION THERAPY: CPT | Performed by: PHYSICAL THERAPIST

## 2020-12-21 NOTE — PROGRESS NOTES
" Physical Therapy Daily Progress Note    Visit #25    Subjective     Crystal Shoulders reports: shoulder is \"OK\" today      Objective   See Exercise, Manual, and Modality Logs for complete treatment.       Assessment/Plan  Scapular strength and posture are improving.  Progress per Plan of Care           Manual Therapy:    0     mins  66675;  Therapeutic Exercise:    34     mins  62823;     Neuromuscular Abbi:    0    mins  98279;    Therapeutic Activity:     0     mins  86468;     Gait Trainin     mins  59176;     Ultrasound:     0     mins  20822;    Electrical Stimulation:    15     mins  31997 ( );    Timed Treatment:   34   mins   Total Treatment:     49   mins    Mariah Vicente PT, DPT  Physical Therapist  [place] License #[id number]                    "

## 2020-12-29 ENCOUNTER — TREATMENT (OUTPATIENT)
Dept: PHYSICAL THERAPY | Facility: CLINIC | Age: 40
End: 2020-12-29

## 2020-12-29 DIAGNOSIS — M25.512 LEFT SHOULDER PAIN, UNSPECIFIED CHRONICITY: Primary | ICD-10-CM

## 2020-12-29 PROCEDURE — 97014 ELECTRIC STIMULATION THERAPY: CPT | Performed by: PHYSICAL THERAPIST

## 2020-12-29 PROCEDURE — 97110 THERAPEUTIC EXERCISES: CPT | Performed by: PHYSICAL THERAPIST

## 2020-12-29 NOTE — PROGRESS NOTES
Physical Therapy Daily Progress Note    Visit #26    Subjective     Crystal Shoulders reports: shoulder feels pretty good today. Still with pain when trying to sleep, however.       Objective   See Exercise, Manual, and Modality Logs for complete treatment.       Assessment/Plan  Tolerated well. Will benefit from continued progression of scapular stabilization and [place] strength activities.   Progress per Plan of Care           Manual Therapy:    0     mins  68552;  Therapeutic Exercise:    36     mins  01242;     Neuromuscular Abbi:    0    mins  93755;    Therapeutic Activity:     0     mins  60444;     Gait Trainin     mins  75453;     Ultrasound:     0     mins  41104;    Electrical Stimulation:    15     mins  28731 ( );    Timed Treatment:   36   mins   Total Treatment:     51   mins    Mariah Vicente PT, DPT  Physical Therapist  [place] License #[id number]

## 2020-12-31 ENCOUNTER — TREATMENT (OUTPATIENT)
Dept: PHYSICAL THERAPY | Facility: CLINIC | Age: 40
End: 2020-12-31

## 2020-12-31 DIAGNOSIS — M25.512 LEFT SHOULDER PAIN, UNSPECIFIED CHRONICITY: Primary | ICD-10-CM

## 2020-12-31 PROCEDURE — 97014 ELECTRIC STIMULATION THERAPY: CPT | Performed by: PHYSICAL THERAPIST

## 2020-12-31 PROCEDURE — 97110 THERAPEUTIC EXERCISES: CPT | Performed by: PHYSICAL THERAPIST

## 2021-01-04 ENCOUNTER — TREATMENT (OUTPATIENT)
Dept: PHYSICAL THERAPY | Facility: CLINIC | Age: 41
End: 2021-01-04

## 2021-01-04 DIAGNOSIS — M25.512 LEFT SHOULDER PAIN, UNSPECIFIED CHRONICITY: Primary | ICD-10-CM

## 2021-01-04 PROCEDURE — 97110 THERAPEUTIC EXERCISES: CPT | Performed by: PHYSICAL THERAPIST

## 2021-01-04 PROCEDURE — 97035 APP MDLTY 1+ULTRASOUND EA 15: CPT | Performed by: PHYSICAL THERAPIST

## 2021-01-04 PROCEDURE — 97014 ELECTRIC STIMULATION THERAPY: CPT | Performed by: PHYSICAL THERAPIST

## 2021-01-04 NOTE — PROGRESS NOTES
Re-Assessment / Re-Certification    Patient: Renee Mattsons   : 1980  Diagnosis/ICD-10 Code:  Left shoulder pain, unspecified chronicity [M25.512]  Referring practitioner: Tal Reed MD  Date of Initial Visit: 2020  Today's Date: 2020  Patient seen for 28 sessions      Subjective:   Renee Shoulders reports: she is noticing less and less pain during the day. Still painful at night when she is trying to sleep.  Subjective Questionnaire: QuickDASH: will assess at next visit  Clinical Progress: improved  Home Program Compliance: Yes  Treatment has included: therapeutic exercise, manual therapy, therapeutic activity, electrical stimulation, ultrasound and cryotherapy    Subjective   Objective          Active Range of Motion   Left Shoulder   Flexion: 151 degrees   Abduction: 149 degrees   External rotation BTH: C6   Internal rotation BTB: T12     Strength/Myotome Testing     Left Shoulder     Planes of Motion   Flexion: 4+   Extension: 4+   External rotation at 45°: 4   Internal rotation at 45°: 4     Isolated Muscles   Lower trapezius: 4   Middle trapezius: 4   Serratus anterior: 4       Assessment/Plan  Progress toward previous goals: Partially Met    Short Term Goals: 4 weeks. Patient will:  1. Be independent with initial HEP (MET)  2. Be instructed in posture and body mechanics. (MET)    Long Term Goals: 8 weeks. Pt will:  1. Exhibit (L) shoulder AROM to WFL to allow for reaching overhead and out (ABD) without pain limiting function. (PROGRESSING)  2. Demonstrate improved Left UE MMT of >/= 4+/5 to allow for performance of ADL's/household management/recreational activities. (PROGRESSING)  3. Pt able to reach overhead and lift 10# to allow for return to doing home/yard/recreational activities with min to no pain. (NOT MET)  4. Report perceived disability </=10% based on QuickDASH (PROGRESSING)      Recommendations: Continue as planned  Timeframe: 1 month  Prognosis to achieve goals:  good    PT Signature: Mariah Vicente, PT, DPT                         Physical Therapist                         [place] License #[id number]    Based upon review of the patient's progress and continued therapy plan, it is my medical opinion that Crystal Shoulders should continue physical therapy treatment at [facility]  [address] [facility], [address] [address] [phone].    Signature: __________________________________  Tal Reed MD    Manual Therapy:    0     mins  13044;  Therapeutic Exercise:    32     mins  66968;     Neuromuscular Abbi:    0    mins  69394;    Therapeutic Activity:     0     mins  64398;     Gait Trainin     mins  92068;     Ultrasound:     8     mins  69756;    Electrical Stimulation:    15     mins  51352 ( );    Timed Treatment:   40   mins   Total Treatment:     55   mins

## 2021-01-11 ENCOUNTER — TREATMENT (OUTPATIENT)
Dept: PHYSICAL THERAPY | Facility: CLINIC | Age: 41
End: 2021-01-11

## 2021-01-11 DIAGNOSIS — M25.512 LEFT SHOULDER PAIN, UNSPECIFIED CHRONICITY: Primary | ICD-10-CM

## 2021-01-11 PROCEDURE — 97110 THERAPEUTIC EXERCISES: CPT | Performed by: PHYSICAL THERAPIST

## 2021-01-11 PROCEDURE — 97035 APP MDLTY 1+ULTRASOUND EA 15: CPT | Performed by: PHYSICAL THERAPIST

## 2021-01-11 PROCEDURE — 97014 ELECTRIC STIMULATION THERAPY: CPT | Performed by: PHYSICAL THERAPIST

## 2021-01-11 NOTE — PROGRESS NOTES
" Physical Therapy Daily Progress Note    Visit #29    Subjective     Crystal Shoulders reports: shoulder is \"OK\", ultrasound helps.      Objective   See Exercise, Manual, and Modality Logs for complete treatment.       Assessment/Plan  Less point tenderness in anterior shoulder. Scapular strength continues to improve.  Progress per Plan of Care           Manual Therapy:    0     mins  87880;  Therapeutic Exercise:    32     mins  79578;     Neuromuscular Abbi:    0    mins  39369;    Therapeutic Activity:     0     mins  08599;     Gait Trainin     mins  25653;     Ultrasound:     8     mins  52619;    Electrical Stimulation:    15     mins  88474 ( );    Timed Treatment:   40   mins   Total Treatment:     55   mins    Mariah Vicente PT, DPT  Physical Therapist  [place] License #[id number]                    "

## 2021-01-18 ENCOUNTER — TREATMENT (OUTPATIENT)
Dept: PHYSICAL THERAPY | Facility: CLINIC | Age: 41
End: 2021-01-18

## 2021-01-18 DIAGNOSIS — M25.512 LEFT SHOULDER PAIN, UNSPECIFIED CHRONICITY: Primary | ICD-10-CM

## 2021-01-18 PROCEDURE — 97035 APP MDLTY 1+ULTRASOUND EA 15: CPT | Performed by: PHYSICAL THERAPIST

## 2021-01-18 PROCEDURE — 97014 ELECTRIC STIMULATION THERAPY: CPT | Performed by: PHYSICAL THERAPIST

## 2021-01-18 PROCEDURE — 97110 THERAPEUTIC EXERCISES: CPT | Performed by: PHYSICAL THERAPIST

## 2021-01-18 NOTE — PROGRESS NOTES
Physical Therapy Daily Progress Note    Visit #30    Subjective     Crystal Shoulders reports: she is worried that she has a tear in her shoulder. Is planning to see doctor. Has several days of minimal pain after PT days, especially with ultrasound.       Objective   See Exercise, Manual, and Modality Logs for complete treatment.       Assessment/Plan  Tolerated exercises well without pain. Agree with pt that additional follow up with MD is warranted.   Progress per Plan of Care           Manual Therapy:    0     mins  42659;  Therapeutic Exercise:    31     mins  01058;     Neuromuscular Abib:    0    mins  70064;    Therapeutic Activity:     0     mins  63101;     Gait Trainin     mins  71369;     Ultrasound:     8     mins  03774;    Electrical Stimulation:    15     mins  17131 ( );    Timed Treatment:   39   mins   Total Treatment:     54   mins    Mariah Vicente PT, DPT  Physical Therapist  [place] License #[id number]

## 2021-01-21 ENCOUNTER — TREATMENT (OUTPATIENT)
Dept: PHYSICAL THERAPY | Facility: CLINIC | Age: 41
End: 2021-01-21

## 2021-01-21 DIAGNOSIS — M25.512 LEFT SHOULDER PAIN, UNSPECIFIED CHRONICITY: Primary | ICD-10-CM

## 2021-01-21 PROCEDURE — 97035 APP MDLTY 1+ULTRASOUND EA 15: CPT | Performed by: PHYSICAL THERAPIST

## 2021-01-21 PROCEDURE — 97014 ELECTRIC STIMULATION THERAPY: CPT | Performed by: PHYSICAL THERAPIST

## 2021-01-21 PROCEDURE — 97110 THERAPEUTIC EXERCISES: CPT | Performed by: PHYSICAL THERAPIST

## 2021-01-21 NOTE — PROGRESS NOTES
Physical Therapy Daily Progress Note    Visit #31    Subjective     Crystal Shoulders reports: she is having more pain during the day. Exercises do not make pain better or worse. Modalities decrease pain. Hasn't yet scheduled to see MD.      Objective   See Exercise, Manual, and Modality Logs for complete treatment.       Assessment/Plan  Encouraged pt to schedule appointment with MD soon, as she has had some improvement with PT but progress has reached plateau in recent visits.   Progress per Plan of Care           Manual Therapy:    0     mins  23191;  Therapeutic Exercise:    32     mins  67696;     Neuromuscular Abbi:    0    mins  81459;    Therapeutic Activity:     0     mins  06561;     Gait Trainin     mins  71263;     Ultrasound:     8     mins  64740;    Electrical Stimulation:    15     mins  81663 ( );    Timed Treatment:   40   mins   Total Treatment:     55   mins    Mariah Vicente PT, DPT  Physical Therapist  [place] License #[id number]

## 2021-01-25 ENCOUNTER — TREATMENT (OUTPATIENT)
Dept: PHYSICAL THERAPY | Facility: CLINIC | Age: 41
End: 2021-01-25

## 2021-01-25 DIAGNOSIS — M25.512 LEFT SHOULDER PAIN, UNSPECIFIED CHRONICITY: Primary | ICD-10-CM

## 2021-01-25 PROCEDURE — 97035 APP MDLTY 1+ULTRASOUND EA 15: CPT | Performed by: PHYSICAL THERAPIST

## 2021-01-25 PROCEDURE — 97014 ELECTRIC STIMULATION THERAPY: CPT | Performed by: PHYSICAL THERAPIST

## 2021-01-25 PROCEDURE — 97110 THERAPEUTIC EXERCISES: CPT | Performed by: PHYSICAL THERAPIST

## 2021-01-25 NOTE — PROGRESS NOTES
Re-Assessment / Re-Certification    Patient: Renee Shoulders   : 1980  Diagnosis/ICD-10 Code:  Left shoulder pain, unspecified chronicity [M25.512]  Referring practitioner: Tal Reed MD  Date of Initial Visit: 2020  Today's Date: 2021  Patient seen for 32 sessions      Subjective:   Renee Shoulders reports: she has been having shoulder pain intermittently during the day. Still limiting her ability to sleep.   Subjective Questionnaire: QuickDASH: 59% limitation  Clinical Progress: unchanged  Home Program Compliance: Yes  Treatment has included: therapeutic exercise, manual therapy, electrical stimulation, ultrasound and cryotherapy    Active Range of Motion   Left Shoulder   Flexion: 135 degrees   Abduction: 141 degrees   External rotation BTH: C6   Internal rotation BTB: T12      Strength/Myotome Testing     Left Shoulder      Planes of Motion   Flexion: 4+   Extension: 4+   External rotation at 45°: 4   Internal rotation at 45°: 4      Isolated Muscles   Lower trapezius: 4   Middle trapezius: 4   Serratus anterior: 4     Assessment/Plan  Progress toward previous goals: Partially Met    Short Term Goals: 4 weeks. Patient will:  1. Be independent with initial HEP (MET)  2. Be instructed in posture and body mechanics. (MET)    Long Term Goals: 8 weeks. Pt will:  1. Exhibit (L) shoulder AROM to WFL to allow for reaching overhead and out (ABD) without pain limiting function. (PROGRESSING)  2. Demonstrate improved Left UE MMT of >/= 4+/5 to allow for performance of ADL's/household management/recreational activities. (PROGRESSING)  3. Pt able to reach overhead and lift 10# to allow for return to doing home/yard/recreational activities with min to no pain. (NOT MET)  4. Report perceived disability </=10% based on QuickDASH (PROGRESSING)      Recommendations: Continue with recommendations as discussed for last few visits, recommend pt follow up with MD for possible further medical management  as progress with PT has been minimal over the past month.  Timeframe: 1 month  Prognosis to achieve goals: good    PT Signature: Mariah Vicente, PT, DPT                         Physical Therapist                         [place] License #[id number]    Based upon review of the patient's progress and continued therapy plan, it is my medical opinion that Crystal Shoulders should continue physical therapy treatment at [facility]  [facility] [address] [address] [phone].    Signature: __________________________________      Manual Therapy:    0     mins  76504;  Therapeutic Exercise:    31     mins  86586;     Neuromuscular Abbi:    0    mins  98813;    Therapeutic Activity:     0     mins  89776;     Gait Trainin     mins  15378;     Ultrasound:     8     mins  38283;    Electrical Stimulation:    15     mins  01182 ( );    Timed Treatment:   39   mins   Total Treatment:     54   mins

## 2021-02-02 ENCOUNTER — TREATMENT (OUTPATIENT)
Dept: PHYSICAL THERAPY | Facility: CLINIC | Age: 41
End: 2021-02-02

## 2021-02-02 DIAGNOSIS — M25.512 LEFT SHOULDER PAIN, UNSPECIFIED CHRONICITY: Primary | ICD-10-CM

## 2021-02-02 PROCEDURE — 97035 APP MDLTY 1+ULTRASOUND EA 15: CPT | Performed by: PHYSICAL THERAPIST

## 2021-02-02 PROCEDURE — 97110 THERAPEUTIC EXERCISES: CPT | Performed by: PHYSICAL THERAPIST

## 2021-02-02 PROCEDURE — 97014 ELECTRIC STIMULATION THERAPY: CPT | Performed by: PHYSICAL THERAPIST

## 2021-02-02 NOTE — PROGRESS NOTES
Physical Therapy Daily Progress Note    Visit #33    Subjective     Crystal Shoulders reports: her shoulder is still painful, no improvement in the past several weeks.      Objective   See Exercise, Manual, and Modality Logs for complete treatment.       Assessment/Plan  Discussed again the recommendation for follow up with MD. She verbalizes understanding and states she will call to schedule.   Progress per Plan of Care           Manual Therapy:    0     mins  57903;  Therapeutic Exercise:    31     mins  90319;     Neuromuscular Abbi:    0    mins  55092;    Therapeutic Activity:     0     mins  36994;     Gait Trainin     mins  01037;     Ultrasound:     8     mins  68591;    Electrical Stimulation:    15     mins  69378 ( );    Timed Treatment:   39   mins   Total Treatment:     53   mins    Mariah Vicente PT, DPT  Physical Therapist  [place] License #[id number]

## 2021-02-04 ENCOUNTER — TREATMENT (OUTPATIENT)
Dept: PHYSICAL THERAPY | Facility: CLINIC | Age: 41
End: 2021-02-04

## 2021-02-04 DIAGNOSIS — M25.512 LEFT SHOULDER PAIN, UNSPECIFIED CHRONICITY: Primary | ICD-10-CM

## 2021-02-04 PROCEDURE — 97014 ELECTRIC STIMULATION THERAPY: CPT | Performed by: PHYSICAL THERAPIST

## 2021-02-04 PROCEDURE — 97035 APP MDLTY 1+ULTRASOUND EA 15: CPT | Performed by: PHYSICAL THERAPIST

## 2021-02-04 PROCEDURE — 97110 THERAPEUTIC EXERCISES: CPT | Performed by: PHYSICAL THERAPIST

## 2021-02-04 NOTE — PROGRESS NOTES
Physical Therapy Daily Progress Note    Visit #34    Subjective     Crystal Shoulders reports: she has an appointment with Dr. Reed on 2/10. States that she gets ~12 hours of pain relief from PT sessions, then pain returns.      Objective   See Exercise, Manual, and Modality Logs for complete treatment.       Assessment/Plan  Tolerated exercises well.   Progress per Plan of Care           Manual Therapy:    0     mins  03676;  Therapeutic Exercise:    31     mins  99919;     Neuromuscular Abbi:    0    mins  63331;    Therapeutic Activity:     0     mins  49056;     Gait Trainin     mins  37793;     Ultrasound:     8     mins  08810;    Electrical Stimulation:    15     mins  64706 ( );    Timed Treatment:   39   mins   Total Treatment:     54   mins    Mariah Vicente PT, DPT  Physical Therapist  [place] License #[id number]

## 2021-02-10 ENCOUNTER — OFFICE VISIT (OUTPATIENT)
Dept: ORTHOPEDIC SURGERY | Facility: CLINIC | Age: 41
End: 2021-02-10

## 2021-02-10 VITALS — TEMPERATURE: 97.2 F | HEIGHT: 72 IN | BODY MASS INDEX: 27.36 KG/M2 | WEIGHT: 202 LBS

## 2021-02-10 DIAGNOSIS — R20.2 NUMBNESS AND TINGLING IN LEFT ARM: Primary | ICD-10-CM

## 2021-02-10 DIAGNOSIS — R20.0 NUMBNESS AND TINGLING IN LEFT ARM: Primary | ICD-10-CM

## 2021-02-10 PROCEDURE — 99213 OFFICE O/P EST LOW 20 MIN: CPT | Performed by: ORTHOPAEDIC SURGERY

## 2021-02-11 ENCOUNTER — TREATMENT (OUTPATIENT)
Dept: PHYSICAL THERAPY | Facility: CLINIC | Age: 41
End: 2021-02-11

## 2021-02-11 DIAGNOSIS — M25.512 LEFT SHOULDER PAIN, UNSPECIFIED CHRONICITY: Primary | ICD-10-CM

## 2021-02-11 PROCEDURE — 97110 THERAPEUTIC EXERCISES: CPT | Performed by: PHYSICAL THERAPIST

## 2021-02-11 PROCEDURE — 97014 ELECTRIC STIMULATION THERAPY: CPT | Performed by: PHYSICAL THERAPIST

## 2021-02-11 NOTE — PROGRESS NOTES
Physical Therapy Daily Progress Note    Visit #35    Subjective     Crystal Shoulders reports: she saw MD, who has ordered an EMG/NCV of her LUE. Recommended continuing PT.       Objective   See Exercise, Manual, and Modality Logs for complete treatment.       Assessment/Plan  Tolerated well without increased pain. Pt requires cueing for postural awareness with exercise, to avoid rounded shoulders and forward head.  Progress per Plan of Care           Manual Therapy:    0     mins  30414;  Therapeutic Exercise:    10     mins  46278; direct    Neuromuscular Abbi:    0    mins  18732;    Therapeutic Activity:     0     mins  25042;     Gait Trainin     mins  35184;     Ultrasound:     8     mins  69762;    Electrical Stimulation:    15     mins  36777 ( );    Timed Treatment:   18   mins   Total Treatment:     55   mins    Mariah Vicente PT, DPT  Physical Therapist  [place] License #[id number]

## 2021-02-23 ENCOUNTER — TREATMENT (OUTPATIENT)
Dept: PHYSICAL THERAPY | Facility: CLINIC | Age: 41
End: 2021-02-23

## 2021-02-23 DIAGNOSIS — M25.512 LEFT SHOULDER PAIN, UNSPECIFIED CHRONICITY: Primary | ICD-10-CM

## 2021-02-23 PROCEDURE — 97035 APP MDLTY 1+ULTRASOUND EA 15: CPT | Performed by: PHYSICAL THERAPIST

## 2021-02-23 PROCEDURE — 97014 ELECTRIC STIMULATION THERAPY: CPT | Performed by: PHYSICAL THERAPIST

## 2021-02-23 PROCEDURE — 97110 THERAPEUTIC EXERCISES: CPT | Performed by: PHYSICAL THERAPIST

## 2021-02-23 NOTE — PROGRESS NOTES
"Re-Assessment / Re-Certification     Patient: Renee Mattsons   : 1980  Diagnosis/ICD-10 Code:  Left shoulder pain, unspecified chronicity [M25.512]  Referring practitioner: Tal Reed MD  Date of Initial Visit: 2020  Today's Date: 2021  Patient seen for 36 sessions        Subjective:   Renee Francois reports: shoulder is \"about the same\". Sleeping is still disturbed. Hasn't yet received a call about scheduling her EMG/NCV.  Subjective Questionnaire: QuickDASH: 59% limitation  Clinical Progress: unchanged  Home Program Compliance: Yes  Treatment has included: therapeutic exercise, manual therapy, electrical stimulation, ultrasound and cryotherapy     Active Range of Motion   Left Shoulder   Flexion: 161 degrees   Abduction: 101 degrees   External rotation BTH: C6   Internal rotation BTB: T12      Strength/Myotome Testing     Left Shoulder      Planes of Motion   Flexion: 4+   Extension: 4+   External rotation at 45°: 4   Internal rotation at 45°: 4      Isolated Muscles   Lower trapezius: 4   Middle trapezius: 4   Serratus anterior: 4      Assessment/Plan  Progress toward previous goals: Partially Met     Short Term Goals: 4 weeks. Patient will:  1. Be independent with initial HEP (MET)  2. Be instructed in posture and body mechanics. (MET)    Long Term Goals: 8 weeks. Pt will:  1. Exhibit (L) shoulder AROM to WFL to allow for reaching overhead and out (ABD) without pain limiting function. (PROGRESSING)  2. Demonstrate improved Left UE MMT of >/= 4+/5 to allow for performance of ADL's/household management/recreational activities. (PROGRESSING)  3. Pt able to reach overhead and lift 10# to allow for return to doing home/yard/recreational activities with min to no pain. (NOT MET)  4. Report perceived disability </=10% based on QuickDASH (PROGRESSING)                Recommendations: Continue with strengthening pending EMG/NCV  Timeframe: 1 month  Prognosis to achieve goals: good     PT " Signature: Mariah Vicente, PT, DPT                         Physical Therapist                         [place] License #[id number]     Based upon review of the patient's progress and continued therapy plan, it is my medical opinion that Crystal Shoulders should continue physical therapy treatment at [facility]  [address] [place] [address], [address] [address] [phone].     Signature: __________________________________       Manual Therapy:            0     mins  21895;  Therapeutic Exercise:    33     mins  10824;     Neuromuscular Abbi:    0    mins  92532;    Therapeutic Activity:      0     mins  88221;     Gait Trainin     mins  60284;     Ultrasound:                     8     mins  76928;    Electrical Stimulation:    15     mins  52408 ( );     Timed Treatment:   41   mins   Total Treatment:     56   mins

## 2021-02-25 ENCOUNTER — TREATMENT (OUTPATIENT)
Dept: PHYSICAL THERAPY | Facility: CLINIC | Age: 41
End: 2021-02-25

## 2021-02-25 DIAGNOSIS — M25.512 LEFT SHOULDER PAIN, UNSPECIFIED CHRONICITY: Primary | ICD-10-CM

## 2021-02-25 PROCEDURE — 97014 ELECTRIC STIMULATION THERAPY: CPT | Performed by: PHYSICAL THERAPIST

## 2021-02-25 PROCEDURE — 97110 THERAPEUTIC EXERCISES: CPT | Performed by: PHYSICAL THERAPIST

## 2021-02-25 PROCEDURE — 97035 APP MDLTY 1+ULTRASOUND EA 15: CPT | Performed by: PHYSICAL THERAPIST

## 2021-02-25 NOTE — PROGRESS NOTES
" Physical Therapy Daily Progress Note    Visit #37    Subjective     Crystal Shoulders reports: shoulder is \"the same\". EMG/NCV scheduled for .       Objective   See Exercise, Manual, and Modality Logs for complete treatment.       Assessment/Plan  Tolerated well, requires cueing for posture with some exercises.   Progress per Plan of Care           Manual Therapy:    0     mins  97464;  Therapeutic Exercise:    32     mins  13379;     Neuromuscular Abbi:    0    mins  81156;    Therapeutic Activity:     0     mins  66678;     Gait Trainin     mins  26996;     Ultrasound:     8     mins  30063;    Electrical Stimulation:    15     mins  47118 ( );    Timed Treatment:   40   mins   Total Treatment:     55   mins    Mariah Vicente PT, DPT  Physical Therapist  [place] License #[id number]                    "

## 2021-03-02 ENCOUNTER — TREATMENT (OUTPATIENT)
Dept: PHYSICAL THERAPY | Facility: CLINIC | Age: 41
End: 2021-03-02

## 2021-03-02 DIAGNOSIS — M25.512 LEFT SHOULDER PAIN, UNSPECIFIED CHRONICITY: Primary | ICD-10-CM

## 2021-03-02 PROCEDURE — 97110 THERAPEUTIC EXERCISES: CPT | Performed by: PHYSICAL THERAPIST

## 2021-03-02 PROCEDURE — 97035 APP MDLTY 1+ULTRASOUND EA 15: CPT | Performed by: PHYSICAL THERAPIST

## 2021-03-02 PROCEDURE — 97014 ELECTRIC STIMULATION THERAPY: CPT | Performed by: PHYSICAL THERAPIST

## 2021-03-02 NOTE — PROGRESS NOTES
Physical Therapy Daily Progress Note    Visit #38    Subjective     Crystal Shoulders reports: is taking new pain medicine for her back, so its hard to tell how her shoulder is feeling. Notes that shoulder feels tight when reaching overhead.      Objective   See Exercise, Manual, and Modality Logs for complete treatment.       Assessment/Plan  Added end range stretches today, tolerated well and improved AROM. Pt to perform these at home as well.  Progress per Plan of Care           Manual Therapy:    0     mins  69473;  Therapeutic Exercise:    31     mins  43304;     Neuromuscular Abbi:    0    mins  28964;    Therapeutic Activity:     0     mins  69483;     Gait Trainin     mins  29018;     Ultrasound:     8     mins  80475;    Electrical Stimulation:    15     mins  57524 ( );    Timed Treatment:   39   mins   Total Treatment:     54   mins    Mariah Vicente PT, DPT  Physical Therapist  [place] License #[id number]

## 2021-03-04 ENCOUNTER — TREATMENT (OUTPATIENT)
Dept: PHYSICAL THERAPY | Facility: CLINIC | Age: 41
End: 2021-03-04

## 2021-03-04 DIAGNOSIS — M25.512 LEFT SHOULDER PAIN, UNSPECIFIED CHRONICITY: Primary | ICD-10-CM

## 2021-03-04 PROCEDURE — 97035 APP MDLTY 1+ULTRASOUND EA 15: CPT | Performed by: PHYSICAL THERAPIST

## 2021-03-04 PROCEDURE — 97110 THERAPEUTIC EXERCISES: CPT | Performed by: PHYSICAL THERAPIST

## 2021-03-04 PROCEDURE — 97014 ELECTRIC STIMULATION THERAPY: CPT | Performed by: PHYSICAL THERAPIST

## 2021-03-04 NOTE — PROGRESS NOTES
" Physical Therapy Daily Progress Note    Visit #39    Subjective     Crystal Shoulders reports: feeling \"OK\"      Objective   See Exercise, Manual, and Modality Logs for complete treatment.       Assessment/Plan  Tolerated well without pain. Continues to require cueing for posture with exercises.   Progress per Plan of Care           Manual Therapy:    0     mins  07949;  Therapeutic Exercise:    31     mins  02464;     Neuromuscular Abbi:    0    mins  72742;    Therapeutic Activity:     0     mins  29188;     Gait Trainin     mins  43921;     Ultrasound:     8     mins  63147;    Electrical Stimulation:    15     mins  35272 ( );    Timed Treatment:   39   mins   Total Treatment:     54   mins    Mariah Vicente PT, DPT  Physical Therapist  [place] License #[id number]                    "

## 2021-03-16 ENCOUNTER — TREATMENT (OUTPATIENT)
Dept: PHYSICAL THERAPY | Facility: CLINIC | Age: 41
End: 2021-03-16

## 2021-03-16 DIAGNOSIS — M25.512 LEFT SHOULDER PAIN, UNSPECIFIED CHRONICITY: Primary | ICD-10-CM

## 2021-03-16 PROCEDURE — 97110 THERAPEUTIC EXERCISES: CPT | Performed by: PHYSICAL THERAPIST

## 2021-03-16 PROCEDURE — 97035 APP MDLTY 1+ULTRASOUND EA 15: CPT | Performed by: PHYSICAL THERAPIST

## 2021-03-16 PROCEDURE — 97014 ELECTRIC STIMULATION THERAPY: CPT | Performed by: PHYSICAL THERAPIST

## 2021-03-16 NOTE — PROGRESS NOTES
Physical Therapy Daily Progress Note    Visit #40    Subjective     Crystal Shoulders reports: she is scheduled for EMG/NCV next month. Shoulder pain is unchanged.       Objective   See Exercise, Manual, and Modality Logs for complete treatment.       Assessment/Plan  Tolerated well, exercise does not increase pain.  Progress per Plan of Care           Manual Therapy:    0     mins  71971;  Therapeutic Exercise:    32     mins  28643;     Neuromuscular Abbi:    0    mins  85075;    Therapeutic Activity:     0     mins  85676;     Gait Trainin     mins  48875;     Ultrasound:     8     mins  40398;    Electrical Stimulation:    15     mins  15292 ( );    Timed Treatment:   40   mins   Total Treatment:     55   mins    Mariah Vicente PT, DPT  Physical Therapist  [place] License #[id number]

## 2021-03-18 ENCOUNTER — TREATMENT (OUTPATIENT)
Dept: PHYSICAL THERAPY | Facility: CLINIC | Age: 41
End: 2021-03-18

## 2021-03-18 DIAGNOSIS — M25.512 LEFT SHOULDER PAIN, UNSPECIFIED CHRONICITY: Primary | ICD-10-CM

## 2021-03-18 PROCEDURE — PTSPMIN2 PR PHYS THER SP 16 TO 30 MINUTES: Performed by: PHYSICAL THERAPIST

## 2021-03-19 NOTE — PROGRESS NOTES
Physical Therapy Daily Progress Note    Visit #41    Subjective     Crystal Shoulders reports: some days her shoulder feels like it's getting better, some days it is worse. Pt feels that modalities help her manage symptoms, and give her a day or so of relief.       Objective   See Exercise, Manual, and Modality Logs for complete treatment.       Assessment/Plan  Pt to continue exercises at home, focus on modalities in clinic at this time.   Progress per Plan of Care           Manual Therapy:    0     mins  01945;  Therapeutic Exercise:    0     mins  67795;     Neuromuscular Abbi:    0    mins  40252;    Therapeutic Activity:     0     mins  10397;     Gait Trainin     mins  70174;     Ultrasound:     8     mins  98680;    Electrical Stimulation:    15     mins  96008 ( );    Timed Treatment:   8   mins   Total Treatment:     23   mins    Mariah Vicente PT, DPT  Physical Therapist  [place] License #[id number]

## 2021-03-25 ENCOUNTER — TREATMENT (OUTPATIENT)
Dept: PHYSICAL THERAPY | Facility: CLINIC | Age: 41
End: 2021-03-25

## 2021-03-25 DIAGNOSIS — M25.512 LEFT SHOULDER PAIN, UNSPECIFIED CHRONICITY: Primary | ICD-10-CM

## 2021-03-25 PROCEDURE — PTSPMIN2 PR PHYS THER SP 16 TO 30 MINUTES: Performed by: PHYSICAL THERAPIST

## 2021-03-25 NOTE — PROGRESS NOTES
Physical Therapy Daily Progress Note    Visit #42    Subjective     Crystal Shoulders reports: her shoulder feels tight, she is having trouble getting a good stretch at home.      Objective   See Exercise, Manual, and Modality Logs for complete treatment.       Assessment/Plan  Added joint mobilizations and manual stretching today, tolerated well and normal PROM after Rx. Encouraged pt to stretch again this evening.  Progress per Plan of Care, reassess next visit           Manual Therapy:    15     mins  36918;  Therapeutic Exercise:    0     mins  66494;     Neuromuscular Abbi:    0    mins  71240;    Therapeutic Activity:     0     mins  74554;     Gait Trainin     mins  81600;     Ultrasound:     8     mins  31932;    Electrical Stimulation:    15     mins  89710 ( );    Timed Treatment:   23   mins   Total Treatment:     38   mins    Mariah Vicente PT, DPT  Physical Therapist  [place] License #[id number]

## 2021-03-30 ENCOUNTER — TREATMENT (OUTPATIENT)
Dept: PHYSICAL THERAPY | Facility: CLINIC | Age: 41
End: 2021-03-30

## 2021-03-30 DIAGNOSIS — M25.512 LEFT SHOULDER PAIN, UNSPECIFIED CHRONICITY: Primary | ICD-10-CM

## 2021-03-30 PROCEDURE — PTSPMIN2 PR PHYS THER SP 16 TO 30 MINUTES: Performed by: PHYSICAL THERAPIST

## 2021-03-30 NOTE — PROGRESS NOTES
Re-Assessment / Re-Certification     Patient: Renee Shoulders   : 1980  Diagnosis/ICD-10 Code:  Left shoulder pain, unspecified chronicity [M25.512]  Referring practitioner: Tal Reed MD  Date of Initial Visit: 2020  Today's Date: 3/30/2021  Patient seen for 43 sessions        Subjective:   Renee Shoulders reports: shoulder has been feeling tight, but manual therapy last visit helped.  Subjective Questionnaire: QuickDASH: 55% limitation  Clinical Progress: improved PROM/AROM, unchanged pain  Home Program Compliance: Yes  Treatment has included: therapeutic exercise, manual therapy, electrical stimulation, ultrasound and cryotherapy     Active Range of Motion   Left Shoulder   Flexion: 161 degrees   Abduction: 145 degrees   External rotation BTH: C6   Internal rotation BTB: T12      Strength/Myotome Testing     Left Shoulder      Planes of Motion   Flexion: 4+   Extension: 4+   External rotation at 45°: 4   Internal rotation at 45°: 4      Isolated Muscles   Lower trapezius: 4   Middle trapezius: 4   Serratus anterior: 4      Assessment/Plan  Progress toward previous goals: Partially Met     Short Term Goals: 4 weeks. Patient will:  1. Be independent with initial HEP (MET)  2. Be instructed in posture and body mechanics. (MET)    Long Term Goals: 8 weeks. Pt will:  1. Exhibit (L) shoulder AROM to WFL to allow for reaching overhead and out (ABD) without pain limiting function. (PROGRESSING)  2. Demonstrate improved Left UE MMT of >/= 4+/5 to allow for performance of ADL's/household management/recreational activities. (PROGRESSING)  3. Pt able to reach overhead and lift 10# to allow for return to doing home/yard/recreational activities with min to no pain. (NOT MET)  4. Report perceived disability </=10% based on QuickDASH (PROGRESSING)                Recommendations: Pt to continue HEP, focus on manual therapy and modalities in clinic pending EMG/NCV on 2021.  Timeframe: 1  month  Prognosis to achieve goals: good     PT Signature: Mariah Vicente, PT, DPT                         Physical Therapist                         [place] License #[id number]     Based upon review of the patient's progress and continued therapy plan, it is my medical opinion that Crystal Shoulders should continue physical therapy treatment at [facility]  [address] [place] [address] [phone].     Signature: __________________________________       Manual Therapy:            18     mins  89897;  Therapeutic Exercise:    0     mins  63224;     Neuromuscular Abbi:    0    mins  45930;    Therapeutic Activity:      0     mins  63799;     Gait Trainin     mins  72598;     Ultrasound:                     8     mins  70394;    Electrical Stimulation:    15     mins  05228 ( );     Timed Treatment:   26   mins   Total Treatment:     41   mins

## 2021-04-16 ENCOUNTER — HOSPITAL ENCOUNTER (OUTPATIENT)
Dept: INFUSION THERAPY | Facility: HOSPITAL | Age: 41
Discharge: HOME | End: 2021-04-16
Admitting: PSYCHIATRY & NEUROLOGY

## 2021-04-16 ENCOUNTER — BULK ORDERING (OUTPATIENT)
Dept: CASE MANAGEMENT | Facility: OTHER | Age: 41
End: 2021-04-16

## 2021-04-16 DIAGNOSIS — R20.0 NUMBNESS AND TINGLING IN LEFT ARM: ICD-10-CM

## 2021-04-16 DIAGNOSIS — R20.2 NUMBNESS AND TINGLING IN LEFT ARM: ICD-10-CM

## 2021-04-16 DIAGNOSIS — Z23 IMMUNIZATION DUE: ICD-10-CM

## 2021-04-16 PROCEDURE — 95910 NRV CNDJ TEST 7-8 STUDIES: CPT | Performed by: PSYCHIATRY & NEUROLOGY

## 2021-04-16 PROCEDURE — 95910 NRV CNDJ TEST 7-8 STUDIES: CPT

## 2021-04-16 PROCEDURE — 95886 MUSC TEST DONE W/N TEST COMP: CPT

## 2021-04-16 PROCEDURE — 95886 MUSC TEST DONE W/N TEST COMP: CPT | Performed by: PSYCHIATRY & NEUROLOGY
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
## 2021-04-21 ENCOUNTER — TELEPHONE (OUTPATIENT)
Dept: ORTHOPEDIC SURGERY | Facility: CLINIC | Age: 41
End: 2021-04-21

## 2021-04-21 NOTE — TELEPHONE ENCOUNTER
I spoke with her.  I explained the nerve study results.  We discussed options.  She is potentially interested in pursuing surgical options.  I think it would be best to have her come into the office so that we can discuss this face-to-face.

## 2021-05-05 ENCOUNTER — OFFICE VISIT (OUTPATIENT)
Dept: ORTHOPEDIC SURGERY | Facility: CLINIC | Age: 41
End: 2021-05-05

## 2021-05-05 VITALS — WEIGHT: 197 LBS | TEMPERATURE: 96.2 F | BODY MASS INDEX: 26.68 KG/M2 | HEIGHT: 72 IN

## 2021-05-05 DIAGNOSIS — R20.0 NUMBNESS AND TINGLING IN LEFT ARM: Primary | ICD-10-CM

## 2021-05-05 DIAGNOSIS — R20.2 NUMBNESS AND TINGLING IN LEFT ARM: Primary | ICD-10-CM

## 2021-05-05 PROCEDURE — 99214 OFFICE O/P EST MOD 30 MIN: CPT | Performed by: ORTHOPAEDIC SURGERY

## 2021-05-05 RX ORDER — FLUTICASONE PROPIONATE 50 MCG
1 SPRAY, SUSPENSION (ML) NASAL EVERY MORNING
COMMUNITY
Start: 2021-04-06

## 2021-05-05 RX ORDER — BROMPHENIRAMINE MALEATE, PSEUDOEPHEDRINE HYDROCHLORIDE, AND DEXTROMETHORPHAN HYDROBROMIDE 2; 30; 10 MG/5ML; MG/5ML; MG/5ML
5 SYRUP ORAL
COMMUNITY
Start: 2021-04-19 | End: 2021-06-04

## 2021-05-05 RX ORDER — CETIRIZINE HYDROCHLORIDE 10 MG/1
10 TABLET ORAL EVERY MORNING
COMMUNITY
Start: 2021-04-06

## 2021-05-05 RX ORDER — CEFAZOLIN SODIUM 2 G/100ML
2 INJECTION, SOLUTION INTRAVENOUS ONCE
Status: CANCELLED | OUTPATIENT
Start: 2021-06-15 | End: 2021-05-05

## 2021-05-05 NOTE — PROGRESS NOTES
Patient:Renee Francois    YOB: 1980    Medical Record Number:[id number]    Chief Complaints: Follow-up regarding left arm numbness and tingling, left acromioclavicular separation    History of Present Illness:     40 y.o. female patient who presents for follow-up of her left upper extremity.  She reports worsening numbness and tingling in her small and ring fingers.  She reports near constant numbness at this point.  She did get the nerve studies and presents today to discuss options.  Her left shoulder is also bothering her again.  Last injection helped tremendously.  She is interested in getting another injection.    Allergies:No Known Allergies    Home Medications:    Current Outpatient Medications:   •  acyclovir (ZOVIRAX) 800 MG tablet, Take 1 tablet by mouth 5 (Five) Times a Day. Take no more than 5 doses a day., Disp: 35 tablet, Rfl: 0  •  brompheniramine-pseudoephedrine-DM 30-2-10 MG/5ML syrup, Take 5 mL by mouth., Disp: , Rfl:   •  cetirizine (zyrTEC) 10 MG tablet, , Disp: , Rfl:   •  fluticasone (FLONASE) 50 MCG/ACT nasal spray, , Disp: , Rfl:   •  gabapentin (NEURONTIN) 600 MG tablet, , Disp: , Rfl:     History reviewed. No pertinent past medical history.    Past Surgical History:   Procedure Laterality Date   • MOUTH SURGERY     • TUMOR REMOVAL      left index finger 2018       Social History     Occupational History   • Not on file   Tobacco Use   • Smoking status: Never Smoker   • Smokeless tobacco: Never Used   Vaping Use   • Vaping Use: Never used   Substance and Sexual Activity   • Alcohol use: Yes   • Drug use: Defer   • Sexual activity: Defer      Social History     Social History Narrative   • Not on file       History reviewed. No pertinent family history.    Review of Systems:      Constitutional: Denies fever, shaking or chills   Eyes: Denies change in visual acuity   HEENT: Denies nasal congestion or sore throat   Respiratory: Denies cough or shortness of breath  "  Cardiovascular: Denies chest pain or edema  Endocrine: Denies tremors, palpitations, intolerance of heat or cold, polyuria, polydipsia.  GI: Denies abdominal pain, nausea, vomiting, bloody stools or diarrhea  : Denies frequency, urgency, incontinence, retention, or nocturia.  Musculoskeletal: Denies numbness, tingling or loss of motor function except as above  Integument: Denies rash, lesion or ulceration   Neurologic: Denies headache or focal weakness, deficits  Heme: Denies spontaneous or excessive bleeding, epistaxis, hematuria, melena, fatigue, enlarged or tender lymph nodes.      All other pertinent positives and negatives as noted above in HPI.    Physical Exam:40 y.o. female  Vitals:    05/05/21 1147   Temp: 96.2 °F (35.7 °C)   Weight: 89.4 kg (197 lb)   Height: 188 cm (74.02\")       General:  Patient is awake and alert.  Appears in no acute distress or discomfort.    Psych:  Affect and demeanor are appropriate.    Extremities: Left upper extremity is examined.  Skin is benign.  No atrophy, swelling or masses.  Focal tenderness over the acromioclavicular joint.  Positive active compression maneuver.  Moderate tenderness over the cubital tunnel with a positive Tinel's.  Full elbow motion.  Good , pinch, finger and thumb abduction strength.  She reports diminished sensation over the small finger but has otherwise normal sensation in her hand.  Brisk capillary refill.    Imaging: Her recent nerve studies are reviewed along with the associated report.  She has findings consistent with a moderate cubital tunnel syndrome.    Assessment/Plan: 1.  Left cubital tunnel syndrome 2.  Left type II acromioclavicular separation    We discussed her cubital tunnel syndrome and the options available to her at this point.  She would like to move forward with surgical options.  She feels like this is progressively worse.  We discussed the surgery and all that entails including all risks, benefits, and alternatives.  The " risks discussed included infection, wound healing problems, hematoma, persistent pain/numbness/weakness despite the surgery, iatrogenic damage to the ulnar nerve which could result in permanent weakness, numbness and dysfunction.  We also talked about positioning related neuropraxia, RSD, DVT, PE and death as well.  No guarrantees were given regarding the results of the procedure.  The patient has acknowledged understanding and consents to proceed.  She would also like to get a repeat injection for the left shoulder.  I suggested we do that at the time of her anesthetic for the elbow.  She is in agreement with that plan.    Tal Reed MD    05/05/2021

## 2021-05-07 PROBLEM — R20.2 NUMBNESS AND TINGLING IN LEFT ARM: Status: ACTIVE | Noted: 2021-05-07

## 2021-05-07 PROBLEM — R20.0 NUMBNESS AND TINGLING IN LEFT ARM: Status: ACTIVE | Noted: 2021-05-07

## 2021-06-01 ENCOUNTER — TRANSCRIBE ORDERS (OUTPATIENT)
Dept: PREADMISSION TESTING | Facility: HOSPITAL | Age: 41
End: 2021-06-01

## 2021-06-01 DIAGNOSIS — Z01.818 OTHER SPECIFIED PRE-OPERATIVE EXAMINATION: Primary | ICD-10-CM

## 2021-06-04 ENCOUNTER — PRE-ADMISSION TESTING (OUTPATIENT)
Dept: PREADMISSION TESTING | Facility: HOSPITAL | Age: 41
End: 2021-06-04

## 2021-06-04 VITALS
WEIGHT: 195 LBS | HEIGHT: 72 IN | SYSTOLIC BLOOD PRESSURE: 113 MMHG | DIASTOLIC BLOOD PRESSURE: 71 MMHG | TEMPERATURE: 98.2 F | OXYGEN SATURATION: 98 % | RESPIRATION RATE: 16 BRPM | BODY MASS INDEX: 26.41 KG/M2 | HEART RATE: 74 BPM

## 2021-06-04 LAB
ANION GAP SERPL CALCULATED.3IONS-SCNC: 10.7 MMOL/L (ref 5–15)
BUN SERPL-MCNC: 8 MG/DL (ref 6–20)
BUN/CREAT SERPL: 16 (ref 7–25)
CALCIUM SPEC-SCNC: 9.2 MG/DL (ref 8.6–10.5)
CHLORIDE SERPL-SCNC: 103 MMOL/L (ref 98–107)
CO2 SERPL-SCNC: 23.3 MMOL/L (ref 22–29)
CREAT SERPL-MCNC: 0.5 MG/DL (ref 0.57–1)
DEPRECATED RDW RBC AUTO: 39.3 FL (ref 37–54)
ERYTHROCYTE [DISTWIDTH] IN BLOOD BY AUTOMATED COUNT: 13.1 % (ref 12.3–15.4)
GFR SERPL CREATININE-BSD FRML MDRD: >150 ML/MIN/1.73
GLUCOSE SERPL-MCNC: 90 MG/DL (ref 65–99)
HCT VFR BLD AUTO: 36 % (ref 34–46.6)
HGB BLD-MCNC: 11.5 G/DL (ref 12–15.9)
MCH RBC QN AUTO: 26.4 PG (ref 26.6–33)
MCHC RBC AUTO-ENTMCNC: 31.9 G/DL (ref 31.5–35.7)
MCV RBC AUTO: 82.6 FL (ref 79–97)
PLATELET # BLD AUTO: 284 10*3/MM3 (ref 140–450)
PMV BLD AUTO: 10.2 FL (ref 6–12)
POTASSIUM SERPL-SCNC: 3.8 MMOL/L (ref 3.5–5.2)
RBC # BLD AUTO: 4.36 10*6/MM3 (ref 3.77–5.28)
SODIUM SERPL-SCNC: 137 MMOL/L (ref 136–145)
WBC # BLD AUTO: 5.54 10*3/MM3 (ref 3.4–10.8)

## 2021-06-04 PROCEDURE — 36415 COLL VENOUS BLD VENIPUNCTURE: CPT

## 2021-06-04 PROCEDURE — 80048 BASIC METABOLIC PNL TOTAL CA: CPT

## 2021-06-04 PROCEDURE — 85027 COMPLETE CBC AUTOMATED: CPT

## 2021-06-04 RX ORDER — MULTIPLE VITAMINS W/ MINERALS TAB 9MG-400MCG
1 TAB ORAL EVERY MORNING
COMMUNITY

## 2021-06-04 RX ORDER — TRAMADOL HYDROCHLORIDE 50 MG/1
50 TABLET ORAL EVERY 6 HOURS PRN
COMMUNITY
Start: 2021-05-07 | End: 2021-06-15 | Stop reason: HOSPADM

## 2021-06-04 RX ORDER — BENZONATATE 200 MG/1
200 CAPSULE ORAL
COMMUNITY
Start: 2021-05-12 | End: 2021-06-04

## 2021-06-04 NOTE — DISCHARGE INSTRUCTIONS
Take the following medications the morning of surgery:  GABAPENTIN, TRAMADOL IF NEEDED    SURGERY OFFICE WILL CALL DAY BEFORE SURGERY WITH HOSPITAL ARRIVAL TIME      If you are on prescription narcotic pain medication to control your pain you may also take that medication the morning of surgery.    General Instructions:  • Do not eat solid food after midnight the night before surgery.  • You may drink clear liquids day of surgery but must stop at least one hour before your hospital arrival time.  • It is beneficial for you to have a clear drink that contains carbohydrates the day of surgery.  We suggest a 12 to 20 ounce bottle of Gatorade or Powerade for non-diabetic patients or a 12 to 20 ounce bottle of G2 or Powerade Zero for diabetic patients. (Pediatric patients, are not advised to drink a 12 to 20 ounce carbohydrate drink)    Clear liquids are liquids you can see through.  Nothing red in color.     Plain water                               Sports drinks  Sodas                                   Gelatin (Jell-O)  Fruit juices without pulp such as white grape juice and apple juice  Popsicles that contain no fruit or yogurt  Tea or coffee (no cream or milk added)  Gatorade / Powerade  G2 / Powerade Zero    • Infants may have breast milk up to four hours before surgery.  • Infants drinking formula may drink formula up to six hours before surgery.   • Patients who avoid smoking, chewing tobacco and alcohol for 4 weeks prior to surgery have a reduced risk of post-operative complications.  Quit smoking as many days before surgery as you can.  • Do not smoke, use chewing tobacco or drink alcohol the day of surgery.   • If applicable bring your C-PAP/ BI-PAP machine.  • Bring any papers given to you in the doctor’s office.  • Wear clean comfortable clothes.  • Do not wear contact lenses, false eyelashes or make-up.  Bring a case for your glasses.   • Bring crutches or walker if applicable.  • Remove all piercings.   Leave jewelry and any other valuables at home.  • Hair extensions with metal clips must be removed prior to surgery.  • The Pre-Admission Testing nurse will instruct you to bring medications if unable to obtain an accurate list in Pre-Admission Testing.        If you were given a blood bank ID arm band remember to bring it with you the day of surgery.    Preventing a Surgical Site Infection:  • For 2 to 3 days before surgery, avoid shaving with a razor because the razor can irritate skin and make it easier to develop an infection.    • Any areas of open skin can increase the risk of a post-operative wound infection by allowing bacteria to enter and travel throughout the body.  Notify your surgeon if you have any skin wounds / rashes even if it is not near the expected surgical site.  The area will need assessed to determine if surgery should be delayed until it is healed.  • The night prior to surgery shower using a fresh bar of anti-bacterial soap (such as Dial) and clean washcloth.  Sleep in a clean bed with clean clothing.  Do not allow pets to sleep with you.  • Shower on the morning of surgery using a fresh bar of anti-bacterial soap (such as Dial) and clean washcloth.  Dry with a clean towel and dress in clean clothing.  • Ask your surgeon if you will be receiving antibiotics prior to surgery.  • Make sure you, your family, and all healthcare providers clean their hands with soap and water or an alcohol based hand  before caring for you or your wound.    Day of surgery:  Your arrival time is approximately two hours before your scheduled surgery time.  Upon arrival, a Pre-op nurse and Anesthesiologist will review your health history, obtain vital signs, and answer questions you may have.  The only belongings needed at this time will be a list of your home medications and if applicable your C-PAP/BI-PAP machine.  A Pre-op nurse will start an IV and you may receive medication in preparation for surgery,  including something to help you relax.     Please be aware that surgery does come with discomfort.  We want to make every effort to control your discomfort so please discuss any uncontrolled symptoms with your nurse.   Your doctor will most likely have prescribed pain medications.      If you are going home after surgery you will receive individualized written care instructions before being discharged.  A responsible adult must drive you to and from the hospital on the day of your surgery and stay with you for 24 hours.  Discharge prescriptions can be filled by the hospital pharmacy during regular pharmacy hours.  If you are having surgery late in the day/evening your prescription may be e-prescribed to your pharmacy.  Please verify your pharmacy hours or chose a 24 hour pharmacy to avoid not having access to your prescription because your pharmacy has closed for the day.    If you are staying overnight following surgery, you will be transported to your hospital room following the recovery period.  [facility] has all private rooms.    If you have any questions please call Pre-Admission Testing at [phone].  Deductibles and co-payments are collected on the day of service. Please be prepared to pay the required co-pay, deductible or deposit on the day of service as defined by your plan.    Patient Education for Self-Quarantine Process    Following your COVID testing, we strongly recommend that you do not leave your home after you have been tested for COVID except to get medical care. This includes not going to work, school or to public areas.  If this is not possible for you to do please limit your activities to only required outings.  Be sure to wear a mask when you are with other people, practice social distancing and wash your hands frequently.      The following items provide additional details to keep you safe.  • Wash your hands with soap and water frequently for at least 20 seconds.    • Avoid touching your eyes, nose and mouth with unwashed hands.  • Do not share anything - utensils, towels, food from the same bowl.   • Have your own utensils, drinking glass, dishes, towels and bedding.   • Do not have visitors.   • Do use FaceTime to stay in touch with family and friends.  • You should stay in a specific room away from others if possible.   • Stay at least 6 feet away from others in the home if you cannot have a dedicated room to yourself.   • Do not snuggle with your pet. While the CDC says there is no evidence that pets can spread COVID-19 or be infected from humans, it is probably best to avoid “petting, snuggling, being kissed or licked and sharing food (during self-quarantine)”, according to the CDC.   • Sanitize household surfaces daily. Include all high touch areas (door handles, light switches, phones, countertops, etc.)  • Do not share a bathroom with others, if possible.   • Wear a mask around others in your home if you are unable to stay in a separate room or 6 feet apart. If  you are unable to wear a mask, have your family member wear a mask if they must be within 6 feet of you.   Call your surgeon immediately if you experience any of the following symptoms:  • Sore Throat  • Shortness of Breath or difficulty breathing  • Cough  • Chills  • Body soreness or muscle pain  • Headache  • Fever  • New loss of taste or smell  • Do not arrive for your surgery ill.  Your procedure will need to be rescheduled to another time.  You will need to call your physician before the day of surgery to avoid any unnecessary exposure to hospital staff as well as other patients.

## 2021-06-12 ENCOUNTER — LAB (OUTPATIENT)
Dept: LAB | Facility: HOSPITAL | Age: 41
End: 2021-06-12

## 2021-06-12 DIAGNOSIS — Z01.818 OTHER SPECIFIED PRE-OPERATIVE EXAMINATION: ICD-10-CM

## 2021-06-12 LAB — SARS-COV-2 ORF1AB RESP QL NAA+PROBE: NOT DETECTED

## 2021-06-12 PROCEDURE — C9803 HOPD COVID-19 SPEC COLLECT: HCPCS

## 2021-06-12 PROCEDURE — U0004 COV-19 TEST NON-CDC HGH THRU: HCPCS

## 2021-06-15 ENCOUNTER — ANESTHESIA (OUTPATIENT)
Dept: PERIOP | Facility: HOSPITAL | Age: 41
End: 2021-06-15

## 2021-06-15 ENCOUNTER — HOSPITAL ENCOUNTER (OUTPATIENT)
Facility: HOSPITAL | Age: 41
Setting detail: HOSPITAL OUTPATIENT SURGERY
Discharge: HOME | End: 2021-06-15
Attending: ORTHOPAEDIC SURGERY | Admitting: ORTHOPAEDIC SURGERY

## 2021-06-15 ENCOUNTER — ANESTHESIA EVENT (OUTPATIENT)
Dept: PERIOP | Facility: HOSPITAL | Age: 41
End: 2021-06-15

## 2021-06-15 VITALS
RESPIRATION RATE: 16 BRPM | TEMPERATURE: 97.6 F | DIASTOLIC BLOOD PRESSURE: 84 MMHG | SYSTOLIC BLOOD PRESSURE: 122 MMHG | OXYGEN SATURATION: 99 % | HEART RATE: 67 BPM

## 2021-06-15 DIAGNOSIS — R20.0 NUMBNESS AND TINGLING IN LEFT ARM: ICD-10-CM

## 2021-06-15 DIAGNOSIS — R20.2 NUMBNESS AND TINGLING IN LEFT ARM: ICD-10-CM

## 2021-06-15 LAB
B-HCG UR QL: NEGATIVE
INTERNAL NEGATIVE CONTROL: NORMAL
INTERNAL POSITIVE CONTROL: NORMAL
Lab: NORMAL

## 2021-06-15 PROCEDURE — 25010000002 MIDAZOLAM PER 1 MG: Performed by: ANESTHESIOLOGY

## 2021-06-15 PROCEDURE — 25010000002 HYDROMORPHONE PER 4 MG: Performed by: ANESTHESIOLOGY

## 2021-06-15 PROCEDURE — 25010000002 ONDANSETRON PER 1 MG: Performed by: NURSE ANESTHETIST, CERTIFIED REGISTERED

## 2021-06-15 PROCEDURE — 25010000002 METHYLPREDNISOLONE PER 80 MG: Performed by: ORTHOPAEDIC SURGERY

## 2021-06-15 PROCEDURE — 64718 REVISE ULNAR NERVE AT ELBOW: CPT | Performed by: ORTHOPAEDIC SURGERY

## 2021-06-15 PROCEDURE — 81025 URINE PREGNANCY TEST: CPT | Performed by: ANESTHESIOLOGY

## 2021-06-15 PROCEDURE — 25010000002 DEXAMETHASONE PER 1 MG: Performed by: NURSE ANESTHETIST, CERTIFIED REGISTERED

## 2021-06-15 PROCEDURE — 20605 DRAIN/INJ JOINT/BURSA W/O US: CPT | Performed by: ORTHOPAEDIC SURGERY

## 2021-06-15 PROCEDURE — C1889 IMPLANT/INSERT DEVICE, NOC: HCPCS | Performed by: ORTHOPAEDIC SURGERY

## 2021-06-15 PROCEDURE — 25010000002 FENTANYL CITRATE (PF) 50 MCG/ML SOLUTION: Performed by: NURSE ANESTHETIST, CERTIFIED REGISTERED

## 2021-06-15 PROCEDURE — 25010000003 CEFAZOLIN IN DEXTROSE 2-4 GM/100ML-% SOLUTION: Performed by: ORTHOPAEDIC SURGERY

## 2021-06-15 PROCEDURE — 76942 ECHO GUIDE FOR BIOPSY: CPT | Performed by: ORTHOPAEDIC SURGERY

## 2021-06-15 PROCEDURE — 25010000002 FENTANYL CITRATE (PF) 50 MCG/ML SOLUTION: Performed by: ANESTHESIOLOGY

## 2021-06-15 PROCEDURE — 25010000002 PROPOFOL 10 MG/ML EMULSION: Performed by: NURSE ANESTHETIST, CERTIFIED REGISTERED

## 2021-06-15 DEVICE — DEV CONTRL TISS STRATAFIX SPIRAL MNCRYL CT2 2/0 30CM: Type: IMPLANTABLE DEVICE | Site: ARM | Status: FUNCTIONAL

## 2021-06-15 RX ORDER — FENTANYL CITRATE 50 UG/ML
50 INJECTION, SOLUTION INTRAMUSCULAR; INTRAVENOUS
Status: DISCONTINUED | OUTPATIENT
Start: 2021-06-15 | End: 2021-06-15 | Stop reason: HOSPADM

## 2021-06-15 RX ORDER — SODIUM CHLORIDE, SODIUM LACTATE, POTASSIUM CHLORIDE, CALCIUM CHLORIDE 600; 310; 30; 20 MG/100ML; MG/100ML; MG/100ML; MG/100ML
9 INJECTION, SOLUTION INTRAVENOUS CONTINUOUS
Status: DISCONTINUED | OUTPATIENT
Start: 2021-06-15 | End: 2021-06-15 | Stop reason: HOSPADM

## 2021-06-15 RX ORDER — SODIUM CHLORIDE 0.9 % (FLUSH) 0.9 %
10 SYRINGE (ML) INJECTION EVERY 12 HOURS SCHEDULED
Status: DISCONTINUED | OUTPATIENT
Start: 2021-06-15 | End: 2021-06-15 | Stop reason: HOSPADM

## 2021-06-15 RX ORDER — ONDANSETRON 2 MG/ML
INJECTION INTRAMUSCULAR; INTRAVENOUS AS NEEDED
Status: DISCONTINUED | OUTPATIENT
Start: 2021-06-15 | End: 2021-06-15 | Stop reason: SURG

## 2021-06-15 RX ORDER — NALOXONE HCL 0.4 MG/ML
0.4 VIAL (ML) INJECTION AS NEEDED
Status: DISCONTINUED | OUTPATIENT
Start: 2021-06-15 | End: 2021-06-15 | Stop reason: HOSPADM

## 2021-06-15 RX ORDER — PROPOFOL 10 MG/ML
VIAL (ML) INTRAVENOUS AS NEEDED
Status: DISCONTINUED | OUTPATIENT
Start: 2021-06-15 | End: 2021-06-15 | Stop reason: SURG

## 2021-06-15 RX ORDER — HYDROMORPHONE HYDROCHLORIDE 1 MG/ML
0.25 INJECTION, SOLUTION INTRAMUSCULAR; INTRAVENOUS; SUBCUTANEOUS
Status: DISCONTINUED | OUTPATIENT
Start: 2021-06-15 | End: 2021-06-15 | Stop reason: HOSPADM

## 2021-06-15 RX ORDER — SODIUM CHLORIDE 0.9 % (FLUSH) 0.9 %
10 SYRINGE (ML) INJECTION AS NEEDED
Status: DISCONTINUED | OUTPATIENT
Start: 2021-06-15 | End: 2021-06-15 | Stop reason: HOSPADM

## 2021-06-15 RX ORDER — DEXAMETHASONE SODIUM PHOSPHATE 10 MG/ML
INJECTION INTRAMUSCULAR; INTRAVENOUS AS NEEDED
Status: DISCONTINUED | OUTPATIENT
Start: 2021-06-15 | End: 2021-06-15 | Stop reason: SURG

## 2021-06-15 RX ORDER — HYDROCODONE BITARTRATE AND ACETAMINOPHEN 5; 325 MG/1; MG/1
1 TABLET ORAL ONCE AS NEEDED
Status: COMPLETED | OUTPATIENT
Start: 2021-06-15 | End: 2021-06-15

## 2021-06-15 RX ORDER — METHYLPREDNISOLONE ACETATE 80 MG/ML
INJECTION, SUSPENSION INTRA-ARTICULAR; INTRALESIONAL; INTRAMUSCULAR; SOFT TISSUE
Status: DISCONTINUED
Start: 2021-06-15 | End: 2021-06-15 | Stop reason: WASHOUT

## 2021-06-15 RX ORDER — LIDOCAINE HYDROCHLORIDE 10 MG/ML
0.5 INJECTION, SOLUTION EPIDURAL; INFILTRATION; INTRACAUDAL; PERINEURAL ONCE AS NEEDED
Status: DISCONTINUED | OUTPATIENT
Start: 2021-06-15 | End: 2021-06-15 | Stop reason: HOSPADM

## 2021-06-15 RX ORDER — NALBUPHINE HCL 10 MG/ML
2 AMPUL (ML) INJECTION EVERY 4 HOURS PRN
Status: DISCONTINUED | OUTPATIENT
Start: 2021-06-15 | End: 2021-06-15 | Stop reason: HOSPADM

## 2021-06-15 RX ORDER — PROMETHAZINE HYDROCHLORIDE 25 MG/1
25 TABLET ORAL ONCE AS NEEDED
Status: DISCONTINUED | OUTPATIENT
Start: 2021-06-15 | End: 2021-06-15 | Stop reason: HOSPADM

## 2021-06-15 RX ORDER — HYDRALAZINE HYDROCHLORIDE 20 MG/ML
5 INJECTION INTRAMUSCULAR; INTRAVENOUS
Status: DISCONTINUED | OUTPATIENT
Start: 2021-06-15 | End: 2021-06-15 | Stop reason: HOSPADM

## 2021-06-15 RX ORDER — HYDROCODONE BITARTRATE AND ACETAMINOPHEN 7.5; 325 MG/1; MG/1
1-2 TABLET ORAL EVERY 4 HOURS PRN
Qty: 42 TABLET | Refills: 0 | Status: SHIPPED | OUTPATIENT
Start: 2021-06-15

## 2021-06-15 RX ORDER — DIPHENHYDRAMINE HYDROCHLORIDE 50 MG/ML
12.5 INJECTION INTRAMUSCULAR; INTRAVENOUS
Status: DISCONTINUED | OUTPATIENT
Start: 2021-06-15 | End: 2021-06-15 | Stop reason: HOSPADM

## 2021-06-15 RX ORDER — ONDANSETRON 2 MG/ML
4 INJECTION INTRAMUSCULAR; INTRAVENOUS ONCE AS NEEDED
Status: DISCONTINUED | OUTPATIENT
Start: 2021-06-15 | End: 2021-06-15 | Stop reason: HOSPADM

## 2021-06-15 RX ORDER — ISOPROPYL ALCOHOL 70 ML/100ML
LIQUID TOPICAL AS NEEDED
Status: DISCONTINUED | OUTPATIENT
Start: 2021-06-15 | End: 2021-06-15 | Stop reason: HOSPADM

## 2021-06-15 RX ORDER — ONDANSETRON 4 MG/1
4 TABLET, FILM COATED ORAL EVERY 8 HOURS PRN
Qty: 30 TABLET | Refills: 0 | Status: SHIPPED | OUTPATIENT
Start: 2021-06-15 | End: 2021-10-11

## 2021-06-15 RX ORDER — ACETAMINOPHEN 500 MG
500 TABLET ORAL ONCE
Status: COMPLETED | OUTPATIENT
Start: 2021-06-15 | End: 2021-06-15

## 2021-06-15 RX ORDER — BUPIVACAINE HYDROCHLORIDE 2.5 MG/ML
INJECTION, SOLUTION EPIDURAL; INFILTRATION; INTRACAUDAL
Status: COMPLETED | OUTPATIENT
Start: 2021-06-15 | End: 2021-06-15

## 2021-06-15 RX ORDER — DOCUSATE SODIUM 100 MG/1
100 CAPSULE, LIQUID FILLED ORAL 2 TIMES DAILY
Qty: 60 CAPSULE | Refills: 0 | Status: SHIPPED | OUTPATIENT
Start: 2021-06-15

## 2021-06-15 RX ORDER — MAGNESIUM HYDROXIDE 1200 MG/15ML
LIQUID ORAL AS NEEDED
Status: DISCONTINUED | OUTPATIENT
Start: 2021-06-15 | End: 2021-06-15 | Stop reason: HOSPADM

## 2021-06-15 RX ORDER — LIDOCAINE HYDROCHLORIDE 20 MG/ML
INJECTION, SOLUTION INFILTRATION; PERINEURAL AS NEEDED
Status: DISCONTINUED | OUTPATIENT
Start: 2021-06-15 | End: 2021-06-15 | Stop reason: SURG

## 2021-06-15 RX ORDER — FENTANYL CITRATE 50 UG/ML
INJECTION, SOLUTION INTRAMUSCULAR; INTRAVENOUS AS NEEDED
Status: DISCONTINUED | OUTPATIENT
Start: 2021-06-15 | End: 2021-06-15 | Stop reason: SURG

## 2021-06-15 RX ORDER — ACETAMINOPHEN 650 MG
TABLET, EXTENDED RELEASE ORAL AS NEEDED
Status: DISCONTINUED | OUTPATIENT
Start: 2021-06-15 | End: 2021-06-15 | Stop reason: HOSPADM

## 2021-06-15 RX ORDER — CEFAZOLIN SODIUM 2 G/100ML
2 INJECTION, SOLUTION INTRAVENOUS ONCE
Status: COMPLETED | OUTPATIENT
Start: 2021-06-15 | End: 2021-06-15

## 2021-06-15 RX ORDER — MIDAZOLAM HYDROCHLORIDE 1 MG/ML
1 INJECTION INTRAMUSCULAR; INTRAVENOUS
Status: COMPLETED | OUTPATIENT
Start: 2021-06-15 | End: 2021-06-15

## 2021-06-15 RX ORDER — NALBUPHINE HCL 10 MG/ML
10 AMPUL (ML) INJECTION EVERY 4 HOURS PRN
Status: DISCONTINUED | OUTPATIENT
Start: 2021-06-15 | End: 2021-06-15 | Stop reason: HOSPADM

## 2021-06-15 RX ADMIN — CEFAZOLIN SODIUM 2 G: 2 INJECTION, SOLUTION INTRAVENOUS at 11:26

## 2021-06-15 RX ADMIN — HYDROMORPHONE HYDROCHLORIDE 0.25 MG: 1 INJECTION, SOLUTION INTRAMUSCULAR; INTRAVENOUS; SUBCUTANEOUS at 13:00

## 2021-06-15 RX ADMIN — MIDAZOLAM 1 MG: 1 INJECTION INTRAMUSCULAR; INTRAVENOUS at 10:29

## 2021-06-15 RX ADMIN — FENTANYL CITRATE 50 MCG: 50 INJECTION INTRAMUSCULAR; INTRAVENOUS at 11:24

## 2021-06-15 RX ADMIN — ONDANSETRON 4 MG: 2 INJECTION INTRAMUSCULAR; INTRAVENOUS at 12:01

## 2021-06-15 RX ADMIN — FENTANYL CITRATE 25 MCG: 50 INJECTION INTRAMUSCULAR; INTRAVENOUS at 12:01

## 2021-06-15 RX ADMIN — FENTANYL CITRATE 50 MCG: 50 INJECTION, SOLUTION INTRAMUSCULAR; INTRAVENOUS at 12:45

## 2021-06-15 RX ADMIN — DEXAMETHASONE SODIUM PHOSPHATE 8 MG: 10 INJECTION INTRAMUSCULAR; INTRAVENOUS at 11:43

## 2021-06-15 RX ADMIN — FENTANYL CITRATE 50 MCG: 50 INJECTION, SOLUTION INTRAMUSCULAR; INTRAVENOUS at 10:29

## 2021-06-15 RX ADMIN — HYDROMORPHONE HYDROCHLORIDE 0.25 MG: 1 INJECTION, SOLUTION INTRAMUSCULAR; INTRAVENOUS; SUBCUTANEOUS at 13:05

## 2021-06-15 RX ADMIN — FENTANYL CITRATE 25 MCG: 50 INJECTION INTRAMUSCULAR; INTRAVENOUS at 11:44

## 2021-06-15 RX ADMIN — FENTANYL CITRATE 50 MCG: 50 INJECTION, SOLUTION INTRAMUSCULAR; INTRAVENOUS at 12:40

## 2021-06-15 RX ADMIN — SODIUM CHLORIDE, POTASSIUM CHLORIDE, SODIUM LACTATE AND CALCIUM CHLORIDE 9 ML/HR: 600; 310; 30; 20 INJECTION, SOLUTION INTRAVENOUS at 10:18

## 2021-06-15 RX ADMIN — LIDOCAINE HYDROCHLORIDE 100 MG: 20 INJECTION, SOLUTION INFILTRATION; PERINEURAL at 11:24

## 2021-06-15 RX ADMIN — ACETAMINOPHEN 500 MG: 500 TABLET, FILM COATED ORAL at 10:18

## 2021-06-15 RX ADMIN — PROPOFOL 250 MG: 10 INJECTION, EMULSION INTRAVENOUS at 11:24

## 2021-06-15 RX ADMIN — HYDROCODONE BITARTRATE AND ACETAMINOPHEN 1 TABLET: 5; 325 TABLET ORAL at 12:40

## 2021-06-15 RX ADMIN — BUPIVACAINE HYDROCHLORIDE 20 ML: 2.5 INJECTION, SOLUTION EPIDURAL; INFILTRATION; INTRACAUDAL; PERINEURAL at 10:39

## 2021-06-15 RX ADMIN — MIDAZOLAM 1 MG: 1 INJECTION INTRAMUSCULAR; INTRAVENOUS at 10:39

## 2021-06-15 RX ADMIN — FENTANYL CITRATE 50 MCG: 50 INJECTION, SOLUTION INTRAMUSCULAR; INTRAVENOUS at 10:39

## 2021-06-15 RX ADMIN — HYDROMORPHONE HYDROCHLORIDE 0.25 MG: 1 INJECTION, SOLUTION INTRAMUSCULAR; INTRAVENOUS; SUBCUTANEOUS at 12:55

## 2021-06-15 RX ADMIN — HYDROMORPHONE HYDROCHLORIDE 0.25 MG: 1 INJECTION, SOLUTION INTRAMUSCULAR; INTRAVENOUS; SUBCUTANEOUS at 12:50

## 2021-06-15 NOTE — ANESTHESIA POSTPROCEDURE EVALUATION
Patient: Renee Shoulders    Procedure Summary     Date: 06/15/21 Room / Location:  [facility]  /  [facility]    Anesthesia Start: 1117 Anesthesia Stop: 1223    Procedure: ULNAR NERVE TRANSPOSITION, acromioclavicular joint injection (Left Elbow) Diagnosis:       Numbness and tingling in left arm      (Numbness and tingling in left arm [R20.0, R20.2])    Surgeons: Tal Reed MD Provider: Yung Bahena MD    Anesthesia Type: general with block ASA Status: 1          Anesthesia Type: general with block    Vitals  Vitals Value Taken Time   /88 06/15/21 1300   Temp 36.4 °C (97.6 °F) 06/15/21 1300   Pulse 78 06/15/21 1312   Resp 16 06/15/21 1300   SpO2 100 % 06/15/21 1313   Vitals shown include unvalidated device data.        Post Anesthesia Care and Evaluation    Patient location during evaluation: bedside  Patient participation: complete - patient participated  Level of consciousness: awake and alert  Pain management: adequate  Airway patency: patent  Anesthetic complications: No anesthetic complications    Cardiovascular status: acceptable  Respiratory status: acceptable  Hydration status: acceptable    Comments: /88   Pulse 81   Temp 36.4 °C (97.6 °F) (Temporal)   Resp 16   LMP 05/27/2021   SpO2 99%

## 2021-06-15 NOTE — ANESTHESIA PROCEDURE NOTES
Airway  Urgency: elective    Date/Time: 6/15/2021 11:25 AM  Airway not difficult    General Information and Staff    Patient location during procedure: OR  Anesthesiologist: Yung Bahena MD  CRNA: Genny Betancur CRNA    Indications and Patient Condition  Indications for airway management: airway protection    Preoxygenated: yes  MILS maintained throughout  Mask difficulty assessment: 1 - vent by mask    Final Airway Details  Final airway type: supraglottic airway      Successful airway: classic and LMA  Size 4    Number of attempts at approach: 1  Assessment: lips, teeth, and gum same as pre-op and atraumatic intubation

## 2021-06-15 NOTE — BRIEF OP NOTE
ULNAR NERVE TRANSPOSITION  Progress Note    Crystal Shoulders  6/15/2021    Pre-op Diagnosis:   Numbness and tingling in left arm [R20.0, R20.2]       Post-Op Diagnosis Codes:     * Numbness and tingling in left arm [R20.0, R20.2]    Procedure/CPT® Codes:        Procedure(s):  ULNAR NERVE TRANSPOSITION, acromioclavicular joint injection    Surgeon(s):  Tal Reed MD    Anesthesia: General    Staff:   Circulator: Kia Hector RN; Meet Parker RN  Scrub Person: Connie Geronimo  Assistant: Alma Rosa Delgado  Assistant: Alma Rosa Delgado      Estimated Blood Loss: 100ml    Urine Voided: * No values recorded between 6/15/2021 11:17 AM and 6/15/2021 12:08 PM *    Specimens:                None          Drains: * No LDAs found *    Findings: See dictation    Complications: None    Assistant: Alma Rosa Delgado  was responsible for performing the following activities: Retraction and their skilled assistance was necessary for the success of this case.    Tal Reed MD     Date: 6/15/2021  Time: 12:08 EDT

## 2021-06-15 NOTE — ANESTHESIA PREPROCEDURE EVALUATION
Anesthesia Evaluation     no history of anesthetic complications:  NPO Solid Status: > 8 hours             Airway   Mallampati: I  TM distance: >3 FB  Neck ROM: full  No difficulty expected  Dental - normal exam     Pulmonary - negative pulmonary ROS and normal exam   (-) not a smoker  Cardiovascular - negative cardio ROS and normal exam        Neuro/Psych- negative ROS  GI/Hepatic/Renal/Endo - negative ROS     Musculoskeletal (-) negative ROS    Abdominal    Substance History      OB/GYN          Other                        Anesthesia Plan    ASA 1     general with block   (Risks of peripheral nerve block were discussed with patient including but not limited to: inadequate block, bleeding, infection, persistent numbness or weakness, nerve damage, painful dysasthesia and need to protect limb while numb.  D/W R&B of GA including but not limited to: heart, lung, liver, kidney, neurologic problems, positioning injuries, dental damage, corneal abrasion and TMJ.  .)  intravenous induction     Anesthetic plan, all risks, benefits, and alternatives have been provided, discussed and informed consent has been obtained with: patient.
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
## 2021-06-15 NOTE — OP NOTE
Orthopaedic Operative Note    Facility: [facility]    Patient: Renee Francois    Medical Record Number: [id number]    YOB: 1980    Dictating Surgeon: Tal Reed M.D.*    Primary Care Physician: Damian Farah PA    Date of Operation: 6/15/2021    Pre-Operative Diagnosis:  Left cubital tunnel syndrome, symptomatic acromioclavicular arthritis    Post-Operative Diagnosis:  Left cubital tunnel syndrome, symptomatic acromioclavicular arthritis    Procedure Performed:    1.  Left ulnar nerve subcutaneous transposition  2.  Left acromioclavicular joint injection    Surgeon: Tal Reed MD     Assistant: Alma Rosa Delgado whose assistance was critical for help with patient positioning, suctioning and irrigation, retraction, manipulation of the extremity for exposure, wound closure and application of the bandages.  Her assistance was critical to the success of this case.     Anesthesia: Regional followed by general    Complications: None.     Estimated Blood Loss: Less than 50 mL.     Implants: None    Specimens: * No orders in the log *    Brief Operative Indication:    Ms. Francois had a long history of pain and neurologic symptoms consistent with left cubital tunnel syndrome.  The patient had failed a trial of conservative treatment.  The risks, benefits, and alternatives to transposition of the ulnar nerve were discussed in detail.  We talked about the surgery itself, how it is done, and the rehabilitation and recovery.  Specifically, with regards to the risks, we talked about the risk of infection, wound healing problems, persistent pain or neurologic symptoms which could necessitate further intervention down the road.  We talked about the potential for iatrogenic injury to the nerve which could result in permanent weakness, numbness, or dysfunction and potentially necessitate further surgery as well.  Last,we did also talk about the risk of RSD, PE, DVT, anesthesia  related complications and medical complications as result of surgery potentially resulting in death.  The patient has consented to proceed.  She also had symptomatic acromioclavicular arthritis.  She wanted to get this injected during the anesthetic.  The risk, benefits and alternatives were discussed.  She consented.    Description of the procedure in detail:  The patient and operative site were identified in the preoperative holding area.  The surgical site was marked.  The patient was taken to the operating room and placed in the supine position.  Adequate general anesthesia was administered.  The patient was repositioned on the operating table in the supine position.  A timeout was taken and preoperative antibiotics administered.     Next, the acromioclavicular joint was demarcated.  The superior aspect of her shoulder over the acromioclavicular joint was prepped with ChloraPrep.  I allowed this to dry.  The acromioclavicular joint was injected with a solution of 80 mg Depo-Medrol and 1 cc half percent bupivacaine with epinephrine.  A sterile dressing was applied.     The left upper extremity was prepped and draped in the standard, sterile fashion.  I cleaned the extremity with an alcohol solution.  A Hibiclens scrub was performed.  The extremity was then prepped with 2 ChloraPrep preps.  I allowed those to dry for 3 minutes before the draping procedure was carried out.    The left arm was then exsanguinated with an Esmarch bandage.  The tourniquet was insufflated to 200 mmHg.  I fashioned an approximately 6 cm incision over the medial aspect of the elbow, centered over the epicondyle.  I incised the skin only.  I bluntly dissected down through the subcutaneous tissues.  Full-thickness anterior and posterior skin flaps were developed.  I took care to keep the cutaneous nerves protected.      I began with the proximal dissection.  The ulnar nerve was identified as it exited the arcade proximally.  This was  carefully dissected out and then vessel loops placed around the nerve.  I very carefully extended this dissection distally.  I took great care to keep the nerve protected throughout the case.  I carefully released the nerve along the cubital tunnel and then extended this dissection down into the forearm musculature to free the nerve up all along its length.  Once I had adequately exposed and dissected out the nerve, I checked to make sure that I could easily translate the nerve anteriorly without any excessive tension.  This was confirmed to be the case and I could easily transpose the nerve anterior to the epicondyle.      I created a fascial sling anteriorly off of the common flexor pronator fascia.  The nerve was transposed and then the fascial sling sewn into place over the top of the nerve.  I took care to make sure that there was no excessive tension across the nerve and that the sling was loosely holding the nerve in position.  I carried the elbow through range of motion to make sure that the nerve tracked well.  I also took care to make sure that there were no adhesions either proximally or distally or areas where the nerve was being impinged.  Once this was confirmed to be the case, I directed my attention to closure.      The wound was copiously irrigated out with sterile saline.  The wound was sequentially closed in a layered fashion using Vicryl for the deep tissues and a running subcuticular Monocryl stitch followed by Steri-Strips for the skin.  Sterile dressings were applied. The patient was awakened and taken to the recovery room in good condition.  The arm was placed in a sling.      Tal Reed MD  06/15/21

## 2021-06-15 NOTE — ANESTHESIA PROCEDURE NOTES
Peripheral Block    Pre-sedation assessment completed: 6/15/2021 10:39 AM    Patient reassessed immediately prior to procedure    Patient location during procedure: pre-op  Start time: 6/15/2021 10:39 AM  Stop time: 6/15/2021 10:39 AM  Reason for block: at surgeon's request and post-op pain management  Performed by  Anesthesiologist: Yung Bahena MD  Preanesthetic Checklist  Completed: patient identified, IV checked, site marked, risks and benefits discussed, surgical consent, monitors and equipment checked, pre-op evaluation and timeout performed  Prep:  Sterile barriers:gloves  Prep: ChloraPrep  Patient monitoring: blood pressure monitoring, continuous pulse oximetry and EKG  Procedure  Sedation:yes    Guidance:ultrasound guided  ULTRASOUND INTERPRETATION.  Using ultrasound guidance a 22 G gauge needle was placed in close proximity to the brachial plexus nerve, at which point, under ultrasound guidance anesthetic was injected in the area of the nerve and spread of the anesthesia was seen on ultrasound in close proximity thereto.  There were no abnormalities seen on ultrasound; a digital image was taken; and the patient tolerated the procedure with no complications. Images:still images obtained    Laterality:left  Block Type:interscalene  Injection Technique:single-shot  Needle Type:short-bevel  Needle Gauge:22 G      Medications Used: bupivacaine PF (MARCAINE) 0.25 % injection, 20 mL      Medications  Comment:.    Post Assessment  Injection Assessment: negative aspiration for heme, no paresthesia on injection and incremental injection  Patient Tolerance:comfortable throughout block  Complications:no

## 2021-06-16 ENCOUNTER — TELEPHONE (OUTPATIENT)
Dept: ORTHOPEDIC SURGERY | Facility: CLINIC | Age: 41
End: 2021-06-16

## 2021-06-16 NOTE — TELEPHONE ENCOUNTER
Postop follow-up call.  I spoke to Ms. Francois.  Reports she is doing well.  She says the nerve block has worn off completely.  She is able to move all of her fingers, but reports she continues to have numbness in her small and ring fingers.  We discussed postop care instructions.  I encouraged her to call us with any questions or concerns prior to her follow-up appointment on 6/30.  She acknowledged understanding of all we discussed and appreciated the call.

## 2021-06-22 ENCOUNTER — TELEPHONE (OUTPATIENT)
Dept: ORTHOPEDIC SURGERY | Facility: CLINIC | Age: 41
End: 2021-06-22

## 2021-06-22 NOTE — TELEPHONE ENCOUNTER
UNABLE TO WARM TRANSFER TO PRACTICE      Caller: RHETT KENNEDY    Relationship to patient: SELF    Best call back number: [phone]    Patient is needing: PATIENT WOULD LIKE A CALL BACK TO DISCUSS POST INSTRUCTIONS FROM TAKING BANDAGES OFF TODAY. PT HAD SX 6-15-21 W/DR. HILL.

## 2021-06-23 NOTE — TELEPHONE ENCOUNTER
I spoke to Ms. Priscila.  I answered all questions to her satisfaction.  She may shower, but avoid scrubbing the incision.  No tub baths, swimming pools, or hot tubs for now.  She acknowledged understanding and appreciated the return call.

## 2021-06-30 ENCOUNTER — OFFICE VISIT (OUTPATIENT)
Dept: ORTHOPEDIC SURGERY | Facility: CLINIC | Age: 41
End: 2021-06-30

## 2021-06-30 VITALS — HEIGHT: 72 IN | TEMPERATURE: 98 F | WEIGHT: 196 LBS | BODY MASS INDEX: 26.55 KG/M2

## 2021-06-30 DIAGNOSIS — Z09 SURGERY FOLLOW-UP: Primary | ICD-10-CM

## 2021-06-30 PROCEDURE — 99024 POSTOP FOLLOW-UP VISIT: CPT | Performed by: ORTHOPAEDIC SURGERY

## 2021-06-30 NOTE — PROGRESS NOTES
Renee Shoulders : 1980 MRN: [id number] DATE: 2021    CC: 2 weeks s/p left ulnar nerve transposition    HPI: Patient returns to clinic today stating pain is improving.  Denies fevers, chills, sweats.  No complaints.  Pain is well-controlled.  She reports numbness and tingling in the ulnar nerve distribution.  She feels like this may be slowly getting better but it is hard to tell at this early stage.    Vitals:    21 1011   Temp: 98 °F (36.7 °C)       Exam:  Incision appears well approximated and benign.  No erythema, drainage or increased warmth.  Able to flex and extend elbow with minimal discomfort.  She can abduct her fingers and flex the FDP to her small but she has some weakness.  She does have diminished sensation in the ulnar nerve distribution.  Palpable radial pulse.  Brisk cap refill.    Impression:  2 weeks s/p left ulnar nerve transposition    Plan:    She has increased neurologic dysfunction since the transposition which I think is related to a neuropraxia from manipulation of the nerve during surgery.  I assured her that this should get better with time.  I will see her back in 1 month.  We discussed appropriate activity modifications and restrictions.    Tal Reed MD

## 2021-07-28 ENCOUNTER — OFFICE VISIT (OUTPATIENT)
Dept: ORTHOPEDIC SURGERY | Facility: CLINIC | Age: 41
End: 2021-07-28

## 2021-07-28 VITALS — HEIGHT: 72 IN | TEMPERATURE: 96.9 F | BODY MASS INDEX: 26.55 KG/M2 | WEIGHT: 196 LBS

## 2021-07-28 DIAGNOSIS — Z09 SURGERY FOLLOW-UP: Primary | ICD-10-CM

## 2021-07-28 PROCEDURE — 99024 POSTOP FOLLOW-UP VISIT: CPT | Performed by: NURSE PRACTITIONER

## 2021-07-28 NOTE — PROGRESS NOTES
Crystal Shoulders : 1980 MRN: [id number] DATE: 2021    CC: 6 weeks s/p left ulnar nerve transposition    HPI: Pt. returns to clinic today stating pain is resolved.  The preoperative neurologic symptoms seem to be improving as well.  Denies any concerns or problems.    Vitals:    21 1030   Temp: 96.9 °F (36.1 °C)       Exam:  The incision appears healed.  Elbow motion is full.  Good motor and sensory function in hand.  Brisk cap refill.    Impression:  6 weeks s/p left ulnar nerve transposition    Plan: The wound is healed furthermore, the neurologic symptoms seem to be improving.  I have encouraged the patient that the symptoms should continue to improve but this process may take a number of months.  Okay to follow-up as needed going forward.     Annabel Garcia, DANICA    2021

## 2021-08-10 ENCOUNTER — TELEPHONE (OUTPATIENT)
Dept: ORTHOPEDIC SURGERY | Facility: CLINIC | Age: 41
End: 2021-08-10

## 2021-08-10 NOTE — TELEPHONE ENCOUNTER
Caller: RHETT KENNEDY    Relationship to patient: SELF    Best call back number:     Patient is needing: UNABLE TO WARM TRANSFER // PER PATIENT SHE WAS ADVISED TO CALL AND LET PRACTICE KNOW IF PAIN WASN'T ANY BETTER - PER PATIENT ON SCALE OF 1 - 10 HER AND IS ABOUT 5 OR 6.  NOT ANY BETTER,

## 2021-08-11 NOTE — TELEPHONE ENCOUNTER
We will need to make her another appointment with me.  Schedule it for late August or early September.  Thanks.

## 2021-08-30 ENCOUNTER — OFFICE VISIT (OUTPATIENT)
Dept: ORTHOPEDIC SURGERY | Facility: CLINIC | Age: 41
End: 2021-08-30

## 2021-08-30 VITALS — WEIGHT: 196 LBS | BODY MASS INDEX: 26.55 KG/M2 | HEIGHT: 72 IN | TEMPERATURE: 97.1 F

## 2021-08-30 DIAGNOSIS — Z09 SURGERY FOLLOW-UP: Primary | ICD-10-CM

## 2021-08-30 PROCEDURE — 99024 POSTOP FOLLOW-UP VISIT: CPT | Performed by: ORTHOPAEDIC SURGERY

## 2021-08-30 NOTE — PROGRESS NOTES
Renee is now a little over 2 months out from her ulnar nerve transposition.  She says she is having discomfort in the posterior aspect the elbow, particularly at night.  She sleeps with her elbows flexed and this really seems to bother her.  Her nerve symptoms are about the same as before surgery per her report.  She still has numbness and tingling in the ulnar nerve distribution.  She feels like these symptoms are slowly getting better.    Her surgical incision is healed.  She is not tender along the incision.  I cannot palpate the ulnar nerve in the cubital tunnel and she has a negative Tinel's.  She has full elbow motion.  Hyperflexion is uncomfortable but she would localizes this to over the olecranon process and the posterior forearm musculature.  Her nerve exam is pretty similar to last visit.  She still has some diminished sensation in the ulnar nerve distribution.  She still has some weakness with abduction and to the FDP of her small finger.    Assessment: 2-month status post left ulnar nerve transposition with persistent ulnar nerve irritation and elbow discomfort.    Plan: I suggest we try putting her in a brace at night to see if that helps.  We will try this for a month and then I will see her back.  If she is no better at the time of that next visit, I think we are going to need to consider referring her for a new nerve test.

## 2021-10-11 ENCOUNTER — OFFICE VISIT (OUTPATIENT)
Dept: ORTHOPEDIC SURGERY | Facility: CLINIC | Age: 41
End: 2021-10-11

## 2021-10-11 VITALS — TEMPERATURE: 98.6 F | WEIGHT: 201.2 LBS | HEIGHT: 72 IN | BODY MASS INDEX: 27.25 KG/M2

## 2021-10-11 DIAGNOSIS — Z09 SURGERY FOLLOW-UP: Primary | ICD-10-CM

## 2021-10-11 PROCEDURE — 99212 OFFICE O/P EST SF 10 MIN: CPT | Performed by: ORTHOPAEDIC SURGERY

## 2021-10-11 NOTE — PROGRESS NOTES
Chief complaint: Follow-up status post left ulnar nerve transposition    Crystal is now 3 months out from surgery.  The brace really seemed to help.  She says that her symptoms are nearly resolved at this point.  She has just a little bit of residual tingling in her small and ring fingers.  She says that the symptoms are far better than before surgery.      Her surgical incision is healed.  Skin is benign.  No atrophy.  Full elbow motion.  No tenderness over the cubital tunnel.  She has good , pinch, finger and thumb abduction strength.  Excellent strength to the FDP to her small and ring fingers.  She still has a little bit of diminished sensation in the tips of her small and ring fingers but otherwise normal sensation in the ulnar nerve distribution.    Assessment: 3-month status post left ulnar nerve transposition    Plan: She seems to be doing great.  I recommend she continue use of the brace until her symptoms are completely resolved.  At that point, she can try transitioning out of the brace.  She can follow-up with me as needed.

## 2023-08-02 ENCOUNTER — OFFICE VISIT (OUTPATIENT)
Dept: ORTHOPEDIC SURGERY | Facility: CLINIC | Age: 43
End: 2023-08-02
Payer: COMMERCIAL

## 2023-08-02 VITALS — BODY MASS INDEX: 29.17 KG/M2 | HEIGHT: 72 IN | WEIGHT: 215.4 LBS | TEMPERATURE: 97.7 F

## 2023-08-02 DIAGNOSIS — M75.52 ACUTE SHOULDER BURSITIS, LEFT: ICD-10-CM

## 2023-08-02 DIAGNOSIS — M25.512 ACUTE PAIN OF LEFT SHOULDER: Primary | ICD-10-CM

## 2023-08-02 RX ADMIN — LIDOCAINE HYDROCHLORIDE 2 ML: 10 INJECTION, SOLUTION EPIDURAL; INFILTRATION; INTRACAUDAL; PERINEURAL at 14:39

## 2023-08-02 RX ADMIN — METHYLPREDNISOLONE ACETATE 80 MG: 80 INJECTION, SUSPENSION INTRA-ARTICULAR; INTRALESIONAL; INTRAMUSCULAR; SOFT TISSUE at 14:39
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
## 2023-08-08 RX ORDER — METHYLPREDNISOLONE ACETATE 80 MG/ML
80 INJECTION, SUSPENSION INTRA-ARTICULAR; INTRALESIONAL; INTRAMUSCULAR; SOFT TISSUE
Status: COMPLETED | OUTPATIENT
Start: 2023-08-02 | End: 2023-08-02

## 2023-08-08 RX ORDER — LIDOCAINE HYDROCHLORIDE 10 MG/ML
2 INJECTION, SOLUTION EPIDURAL; INFILTRATION; INTRACAUDAL; PERINEURAL
Status: COMPLETED | OUTPATIENT
Start: 2023-08-02 | End: 2023-08-02

## (undated) DEVICE — GLV SURG SIGNATURE ESSENTIAL PF LTX SZ7

## (undated) DEVICE — GLV SURG BIOGEL LTX PF 7

## (undated) DEVICE — 3M™ STERI-STRIP™ COMPOUND BENZOIN TINCTURE 40 BAGS/CARTON 4 CARTONS/CASE C1544: Brand: 3M™ STERI-STRIP™

## (undated) DEVICE — SUT VIC 3/0 SH 27IN J416H

## (undated) DEVICE — ARM SLING: Brand: DEROYAL

## (undated) DEVICE — DRAPE,U/ SHT,SPLIT,PLAS,STERIL: Brand: MEDLINE

## (undated) DEVICE — PENCL E/S ULTRAVAC TELESCP NOSE HOLSTR 10FT

## (undated) DEVICE — UNDERCAST PADDING: Brand: DEROYAL

## (undated) DEVICE — SKIN PREP TRAY W/CHG: Brand: MEDLINE INDUSTRIES, INC.

## (undated) DEVICE — PK ORTHO MINOR TOWER 40

## (undated) DEVICE — SOL ISO/ALC RUB 70PCT 4OZ

## (undated) DEVICE — TRAP FLD MINIVAC MEGADYNE 100ML

## (undated) DEVICE — GLV SURG SIGNATURE ESSENTIAL PF LTX SZ8.5

## (undated) DEVICE — GOWN,NON-REINFORCED,SIRUS,SET IN SLV,XXL: Brand: MEDLINE

## (undated) DEVICE — GLV SURG BIOGEL LTX PF 6 1/2

## (undated) DEVICE — 3M™ STERI-STRIP™ REINFORCED ADHESIVE SKIN CLOSURES, R1547, 1/2 IN X 4 IN (12 MM X 100 MM), 6 STRIPS/ENVELOPE: Brand: 3M™ STERI-STRIP™

## (undated) DEVICE — PATIENT RETURN ELECTRODE, SINGLE-USE, CONTACT QUALITY MONITORING, ADULT, WITH 9FT CORD, FOR PATIENTS WEIGING OVER 33LBS. (15KG): Brand: MEGADYNE

## (undated) DEVICE — 3M™ STERI-DRAPE™ U-DRAPE 1015: Brand: STERI-DRAPE™

## (undated) DEVICE — DISPOSABLE TOURNIQUET CUFF SINGLE BLADDER, SINGLE PORT AND QUICK CONNECT CONNECTOR: Brand: COLOR CUFF

## (undated) DEVICE — STCKNT IMPERV 9X36IN STRL

## (undated) DEVICE — APPL CHLORAPREP HI/LITE 26ML ORNG

## (undated) DEVICE — DRSNG WND GZ CURAD OIL EMULSION 3X3IN STRL

## (undated) DEVICE — GLV SURG BIOGEL LTX PF 8 1/2

## (undated) DEVICE — SUT VIC 2/0 CT2 27IN J269H

## (undated) DEVICE — BNDG ELAS ELITE V/CLOSE 4IN 5YD LF STRL